# Patient Record
Sex: MALE | Race: WHITE | NOT HISPANIC OR LATINO | Employment: OTHER | URBAN - METROPOLITAN AREA
[De-identification: names, ages, dates, MRNs, and addresses within clinical notes are randomized per-mention and may not be internally consistent; named-entity substitution may affect disease eponyms.]

---

## 2021-12-29 ENCOUNTER — HOSPITAL ENCOUNTER (OUTPATIENT)
Dept: RADIOLOGY | Facility: MEDICAL CENTER | Age: 61
End: 2021-12-29

## 2021-12-29 ENCOUNTER — HOSPITAL ENCOUNTER (INPATIENT)
Facility: MEDICAL CENTER | Age: 61
LOS: 2 days | DRG: 083 | End: 2021-12-31
Attending: EMERGENCY MEDICINE | Admitting: SURGERY
Payer: COMMERCIAL

## 2021-12-29 DIAGNOSIS — R51.9 NONINTRACTABLE HEADACHE, UNSPECIFIED CHRONICITY PATTERN, UNSPECIFIED HEADACHE TYPE: ICD-10-CM

## 2021-12-29 DIAGNOSIS — S06.5XAA SUBDURAL HEMATOMA (HCC): ICD-10-CM

## 2021-12-29 DIAGNOSIS — Z79.01 CHRONIC ANTICOAGULATION: ICD-10-CM

## 2021-12-29 PROBLEM — Z11.52 ENCOUNTER FOR SCREENING FOR COVID-19: Status: ACTIVE | Noted: 2021-12-29

## 2021-12-29 PROBLEM — I48.91 AF (ATRIAL FIBRILLATION) (HCC): Status: ACTIVE | Noted: 2021-12-29

## 2021-12-29 PROBLEM — Z98.890 H/O MITRAL VALVE REPAIR: Status: ACTIVE | Noted: 2021-12-29

## 2021-12-29 PROBLEM — S32.009A LUMBAR TRANSVERSE PROCESS FRACTURE, CLOSED, INITIAL ENCOUNTER (HCC): Status: ACTIVE | Noted: 2021-12-29

## 2021-12-29 PROBLEM — T14.90XA TRAUMA: Status: ACTIVE | Noted: 2021-12-29

## 2021-12-29 LAB
APTT PPP: 39.8 SEC (ref 24.7–36)
CFT BLD TEG: 8.5 MIN (ref 4.6–9.1)
CFT P HPASE BLD TEG: 6.9 MIN (ref 4.3–8.3)
CLOT ANGLE BLD TEG: 68.3 DEGREES (ref 63–78)
CT.EXTRINSIC BLD ROTEM: 1.8 MIN (ref 0.8–2.1)
INR PPP: 1.23 (ref 0.87–1.13)
MCF BLD TEG: 59.8 MM (ref 52–69)
MCF.PLATELET INHIB BLD ROTEM: 19.2 MM (ref 15–32)
PA AA BLD-ACNC: 39.4 % (ref 0–11)
PA ADP BLD-ACNC: 6.7 % (ref 0–17)
PROTHROMBIN TIME: 15.2 SEC (ref 12–14.6)
TEG ALGORITHM TGALG: ABNORMAL

## 2021-12-29 PROCEDURE — 700101 HCHG RX REV CODE 250: Performed by: SURGERY

## 2021-12-29 PROCEDURE — A9270 NON-COVERED ITEM OR SERVICE: HCPCS | Performed by: SURGERY

## 2021-12-29 PROCEDURE — 36415 COLL VENOUS BLD VENIPUNCTURE: CPT

## 2021-12-29 PROCEDURE — 99222 1ST HOSP IP/OBS MODERATE 55: CPT | Performed by: SURGERY

## 2021-12-29 PROCEDURE — 85576 BLOOD PLATELET AGGREGATION: CPT

## 2021-12-29 PROCEDURE — 85610 PROTHROMBIN TIME: CPT | Mod: 91

## 2021-12-29 PROCEDURE — 85347 COAGULATION TIME ACTIVATED: CPT

## 2021-12-29 PROCEDURE — 85730 THROMBOPLASTIN TIME PARTIAL: CPT | Mod: 91

## 2021-12-29 PROCEDURE — 700102 HCHG RX REV CODE 250 W/ 637 OVERRIDE(OP): Performed by: SURGERY

## 2021-12-29 PROCEDURE — A9270 NON-COVERED ITEM OR SERVICE: HCPCS | Performed by: PHYSICIAN ASSISTANT

## 2021-12-29 PROCEDURE — 85384 FIBRINOGEN ACTIVITY: CPT

## 2021-12-29 PROCEDURE — 700102 HCHG RX REV CODE 250 W/ 637 OVERRIDE(OP): Performed by: PHYSICIAN ASSISTANT

## 2021-12-29 PROCEDURE — 770022 HCHG ROOM/CARE - ICU (200)

## 2021-12-29 PROCEDURE — 99291 CRITICAL CARE FIRST HOUR: CPT

## 2021-12-29 RX ORDER — BUTALBITAL, ACETAMINOPHEN AND CAFFEINE 50; 325; 40 MG/1; MG/1; MG/1
1 TABLET ORAL EVERY 6 HOURS PRN
Status: DISCONTINUED | OUTPATIENT
Start: 2021-12-29 | End: 2021-12-31 | Stop reason: HOSPADM

## 2021-12-29 RX ORDER — AMOXICILLIN 250 MG
1 CAPSULE ORAL NIGHTLY
Status: DISCONTINUED | OUTPATIENT
Start: 2021-12-29 | End: 2021-12-31 | Stop reason: HOSPADM

## 2021-12-29 RX ORDER — ENEMA 19; 7 G/133ML; G/133ML
1 ENEMA RECTAL
Status: DISCONTINUED | OUTPATIENT
Start: 2021-12-29 | End: 2021-12-31 | Stop reason: HOSPADM

## 2021-12-29 RX ORDER — LEVETIRACETAM 500 MG/1
500 TABLET ORAL 2 TIMES DAILY
Status: DISCONTINUED | OUTPATIENT
Start: 2021-12-29 | End: 2021-12-31 | Stop reason: HOSPADM

## 2021-12-29 RX ORDER — BISACODYL 10 MG
10 SUPPOSITORY, RECTAL RECTAL
Status: DISCONTINUED | OUTPATIENT
Start: 2021-12-29 | End: 2021-12-31 | Stop reason: HOSPADM

## 2021-12-29 RX ORDER — AMOXICILLIN 250 MG
1 CAPSULE ORAL
Status: DISCONTINUED | OUTPATIENT
Start: 2021-12-29 | End: 2021-12-31 | Stop reason: HOSPADM

## 2021-12-29 RX ORDER — ACETAMINOPHEN 500 MG
500 TABLET ORAL EVERY 6 HOURS PRN
Status: ON HOLD | COMMUNITY
End: 2021-12-31

## 2021-12-29 RX ORDER — HYDROMORPHONE HYDROCHLORIDE 1 MG/ML
0.5 INJECTION, SOLUTION INTRAMUSCULAR; INTRAVENOUS; SUBCUTANEOUS
Status: DISCONTINUED | OUTPATIENT
Start: 2021-12-29 | End: 2021-12-30

## 2021-12-29 RX ORDER — ONDANSETRON 2 MG/ML
4 INJECTION INTRAMUSCULAR; INTRAVENOUS EVERY 4 HOURS PRN
Status: DISCONTINUED | OUTPATIENT
Start: 2021-12-29 | End: 2021-12-30

## 2021-12-29 RX ORDER — OXYCODONE HYDROCHLORIDE 5 MG/1
5 TABLET ORAL
Status: DISCONTINUED | OUTPATIENT
Start: 2021-12-29 | End: 2021-12-31 | Stop reason: HOSPADM

## 2021-12-29 RX ORDER — POLYETHYLENE GLYCOL 3350 17 G/17G
1 POWDER, FOR SOLUTION ORAL 2 TIMES DAILY
Status: DISCONTINUED | OUTPATIENT
Start: 2021-12-29 | End: 2021-12-31 | Stop reason: HOSPADM

## 2021-12-29 RX ORDER — DOCUSATE SODIUM 100 MG/1
100 CAPSULE, LIQUID FILLED ORAL 2 TIMES DAILY
Status: DISCONTINUED | OUTPATIENT
Start: 2021-12-29 | End: 2021-12-31 | Stop reason: HOSPADM

## 2021-12-29 RX ORDER — OXYCODONE HYDROCHLORIDE 10 MG/1
10 TABLET ORAL
Status: DISCONTINUED | OUTPATIENT
Start: 2021-12-29 | End: 2021-12-31 | Stop reason: HOSPADM

## 2021-12-29 RX ORDER — HYDRALAZINE HYDROCHLORIDE 20 MG/ML
20 INJECTION INTRAMUSCULAR; INTRAVENOUS EVERY 6 HOURS PRN
Status: DISCONTINUED | OUTPATIENT
Start: 2021-12-29 | End: 2021-12-30

## 2021-12-29 RX ORDER — LABETALOL HYDROCHLORIDE 5 MG/ML
10 INJECTION, SOLUTION INTRAVENOUS EVERY 4 HOURS PRN
Status: DISCONTINUED | OUTPATIENT
Start: 2021-12-29 | End: 2021-12-31 | Stop reason: HOSPADM

## 2021-12-29 RX ORDER — ACETAMINOPHEN 500 MG
1000 TABLET ORAL EVERY 6 HOURS PRN
Status: DISCONTINUED | OUTPATIENT
Start: 2022-01-03 | End: 2021-12-31 | Stop reason: HOSPADM

## 2021-12-29 RX ORDER — WARFARIN SODIUM 5 MG/1
5 TABLET ORAL DAILY
Status: ON HOLD | COMMUNITY
End: 2021-12-30

## 2021-12-29 RX ORDER — ACETAMINOPHEN 500 MG
1000 TABLET ORAL EVERY 6 HOURS
Status: DISCONTINUED | OUTPATIENT
Start: 2021-12-29 | End: 2021-12-31 | Stop reason: HOSPADM

## 2021-12-29 RX ORDER — ONDANSETRON 4 MG/1
4 TABLET, ORALLY DISINTEGRATING ORAL EVERY 4 HOURS PRN
Status: DISCONTINUED | OUTPATIENT
Start: 2021-12-29 | End: 2021-12-31 | Stop reason: HOSPADM

## 2021-12-29 RX ADMIN — DILTIAZEM HYDROCHLORIDE 30 MG: 30 TABLET, FILM COATED ORAL at 21:03

## 2021-12-29 RX ADMIN — ACETAMINOPHEN 1000 MG: 500 TABLET ORAL at 18:13

## 2021-12-29 RX ADMIN — DOCUSATE SODIUM 50 MG AND SENNOSIDES 8.6 MG 1 TABLET: 8.6; 5 TABLET, FILM COATED ORAL at 21:03

## 2021-12-29 RX ADMIN — LABETALOL HYDROCHLORIDE 10 MG: 5 INJECTION, SOLUTION INTRAVENOUS at 16:09

## 2021-12-29 RX ADMIN — BUTALBITAL, ACETAMINOPHEN, AND CAFFEINE 1 TABLET: 50; 325; 40 TABLET ORAL at 20:15

## 2021-12-29 RX ADMIN — OXYCODONE 5 MG: 5 TABLET ORAL at 22:27

## 2021-12-29 RX ADMIN — LEVETIRACETAM 500 MG: 500 TABLET, FILM COATED ORAL at 21:03

## 2021-12-29 RX ADMIN — OXYCODONE 5 MG: 5 TABLET ORAL at 14:30

## 2021-12-29 RX ADMIN — OXYCODONE 5 MG: 5 TABLET ORAL at 18:13

## 2021-12-29 RX ADMIN — BUTALBITAL, ACETAMINOPHEN, AND CAFFEINE 1 TABLET: 50; 325; 40 TABLET ORAL at 13:06

## 2021-12-29 RX ADMIN — ACETAMINOPHEN 1000 MG: 500 TABLET ORAL at 13:29

## 2021-12-29 ASSESSMENT — PAIN DESCRIPTION - PAIN TYPE
TYPE: ACUTE PAIN

## 2021-12-29 ASSESSMENT — COPD QUESTIONNAIRES
COPD SCREENING SCORE: 2
HAVE YOU SMOKED AT LEAST 100 CIGARETTES IN YOUR ENTIRE LIFE: NO/DON'T KNOW
DO YOU EVER COUGH UP ANY MUCUS OR PHLEGM?: NO/ONLY WITH OCCASIONAL COLDS OR INFECTIONS
DURING THE PAST 4 WEEKS HOW MUCH DID YOU FEEL SHORT OF BREATH: NONE/LITTLE OF THE TIME

## 2021-12-29 ASSESSMENT — FIBROSIS 4 INDEX
FIB4 SCORE: 1.46
FIB4 SCORE: 1.46

## 2021-12-29 ASSESSMENT — PATIENT HEALTH QUESTIONNAIRE - PHQ9
1. LITTLE INTEREST OR PLEASURE IN DOING THINGS: NOT AT ALL
SUM OF ALL RESPONSES TO PHQ9 QUESTIONS 1 AND 2: 0
2. FEELING DOWN, DEPRESSED, IRRITABLE, OR HOPELESS: NOT AT ALL

## 2021-12-29 NOTE — PROGRESS NOTES
Patient arrived to S122 and attached to ICU monitor.    Vitals:  BP- 136/73  HR- 64  SpO2- 91% on RA  Temp- 97.8 F temporal  Weight- 119 kg    4 Eyes Skin Assessment Completed by KRISTY Jennings and KRISTY Carlos.     Areas of concern:   - RLE abrasion    Devices in place and skin assessed underneath:   [x]Pulse Ox    [x]EKG leads   [x]BP cuff   [x]PIVx 2  []Central Line   []Arterial line   []SCDs   []Valencia   []Purewick   []Condom Cath   []BMS/rectal tube   []OG/NG Tube   []Cortrak   []ETT/tracheostomy tube   []Oxymask   []Bipap    []Nasal Canula   []Restraints   []C-collar   []Splint (specify):   []Other(specify):     Belongings (placed in patient closet unless otherwise noted):   - Wallet    - Glasses   - Shirt   - Pants   - Underwear   - Shoes   - Socks   - Hat   - Cell phone (at bedside)

## 2021-12-29 NOTE — ED NOTES
Bedside report given to ICU RN. Pt taken up to SICU in wheelchair. Pt has all belongings in his possession.

## 2021-12-29 NOTE — ASSESSMENT & PLAN NOTE
Chronic condition treated with warfarin and diltiazem.  Systemic anticoagulation held on admission.   12/30 Resumed diltiazem.

## 2021-12-29 NOTE — ED TRIAGE NOTES
"Chief Complaint   Patient presents with   • Headache     Pt bib EMS from [facility] after it was discovered he had a right subdural hematoma after falling off a horse on Thanksgiving day. Pt is on warfarin from a mitral valve replacement 4 years ago, warfarin was reversed at previous facility with K-centra. Pt was on cardene upon arrival. Pt complains of 4/10 headache, pt received IV tylenol from EMS. Pt also has a L5 transverse fracture that does not require surgery.      /70   Pulse 70   Temp 36.4 °C (97.5 °F) (Temporal)   Resp 18   Ht 1.854 m (6' 1\")   Wt 113 kg (250 lb)   SpO2 91%   BMI 32.98 kg/m²     "

## 2021-12-29 NOTE — ED PROVIDER NOTES
ED Provider Note    Scribed for Tonny Whelan M.D. by Emma Chambers. 12/29/2021  11:05 AM    Primary care provider: Alfredito Vela M.D.  Means of arrival: EMS  History obtained from: EMS and Patient  History limited by: None noted    CHIEF COMPLAINT  Chief Complaint   Patient presents with    Headache     Pt bib EMS from [facility] after it was discovered he had a right subdural hematoma after falling off a horse on Thanksgiving day. Pt is on warfarin from a mitral valve replacement 4 years ago, warfarin was reversed at previous facility with K-centra. Pt was on cardene upon arrival. Pt complains of 4/10 headache, pt received IV tylenol from EMS. Pt also has a L5 transverse fracture that does not require surgery.        HPI  Will Walker is a 61 y.o. male who presents to the Emergency Department with a headache onset 2 weeks ago. He rates his headache a 4/10 in severity. He denies any vision changes and weakness. Patient was initially bucked off a horse and has been taking warfarin. Patient states he is followed by Dr. Patino, cardiology. The patient has a history of biological valve. Patient has a history of L5 transverse fracture.    REVIEW OF SYSTEMS   Pertinent positives include headache.   Pertinent negatives include no vision changes or weakness.    All other systems reviewed and negative. See HPI for further details.     PAST MEDICAL HISTORY   has a past medical history of No known health problems and Primary pulmonary HTN (HCC).    SURGICAL HISTORY   has a past surgical history that includes coronart artery bypass, 2 (11/2017) and other.    SOCIAL HISTORY  Social History     Tobacco Use    Smoking status: Never Smoker    Smokeless tobacco: Former User     Types: Chew   Vaping Use    Vaping Use: Never used   Substance Use Topics    Alcohol use: Not on file    Drug use: No      Social History     Substance and Sexual Activity   Drug Use No       FAMILY HISTORY  None noted    CURRENT MEDICATIONS  Home Medications  "      Reviewed by Dorie Dick R.N. (Registered Nurse) on 12/29/21 at 1101  Med List Status: Not Addressed     Medication Last Dose Status   aspirin EC (ECOTRIN) 81 MG Tablet Delayed Response  Active   DILTIAZem (CARDIZEM) 30 MG Tab  Active   ibuprofen (MOTRIN) 200 MG Tab  Active   MD ALERT... warfarin (COUMADIN)  Active   simvastatin (ZOCOR) 5 MG Tab  Active                    ALLERGIES  No Known Allergies    PHYSICAL EXAM  VITAL SIGNS: /70   Pulse 70   Temp 36.4 °C (97.5 °F) (Temporal)   Resp 18   Ht 1.854 m (6' 1\")   Wt 113 kg (250 lb)   SpO2 91%   BMI 32.98 kg/m²     Nursing note and vitals reviewed.  Constitutional: Well-developed and well-nourished. No distress.   HENT: Head is normocephalic and atraumatic. Oropharynx is clear and moist without exudate or erythema.   Eyes: Pupils are equal, round, and reactive to light. Conjunctiva are normal.   Cardiovascular: Normal rate and regular rhythm. No murmur heard. Normal radial pulses.  Pulmonary/Chest: Breath sounds normal. No wheezes or rales.   Abdominal: Soft and non-tender. No distention    Musculoskeletal: Extremities exhibit normal range of motion without edema or tenderness.   Neurological: Awake, alert and oriented to person, place, and time. No focal deficits noted.  Skin: Skin is warm and dry. No rash.   Psychiatric: Normal mood and affect. Appropriate for clinical situation.    DIAGNOSTIC STUDIES / PROCEDURES    LABS  Results for orders placed or performed during the hospital encounter of 12/29/21   CBC WITH DIFFERENTIAL   Result Value Ref Range    WBC 6.6 4.8 - 10.8 K/uL    RBC 5.21 4.70 - 6.10 M/uL    Hemoglobin 15.7 14.0 - 18.0 g/dL    Hematocrit 47.0 42.0 - 52.0 %    MCV 90.2 80.0 - 94.0 fL    MCH 30.1 27.0 - 31.0 pg    MCHC 33.4 33.0 - 37.0 g/dL    RDW 12.5 11.5 - 14.5 %    Platelet Count 169 130 - 400 K/uL    MPV 10.8 (H) 7.4 - 10.4 fL    Neutrophils Automated 66.2 39.0 - 70.0 %    Lymphocytes Automated 19.6 (L) 21.0 - 50.0 %    " Monocytes Automated 11.7 (H) 2.0 - 9.0 %    Eosinophils Automated 1.4 0.0 - 5.0 %    Basophils Automated 0.9 0.0 - 3.0 %    Abs Neutrophils Automated 4.4 1.8 - 7.7 K/uL    Abs Lymph Automated 1.3 1.2 - 4.8 K/uL    Eosinophil Count, Blood 0.09 0.00 - 0.50 K/uL   COMP METABOLIC PANEL   Result Value Ref Range    Sodium 137 136 - 145 mmol/L    Potassium 4.1 3.5 - 5.1 mmol/L    Chloride 101 98 - 107 mmol/L    Co2 24 21 - 32 mmol/L    Anion Gap 16 10 - 18 mmol/L    Glucose 108 (H) 74 - 99 mg/dL    Bun 17 7 - 18 mg/dL    Creatinine 1.1 0.8 - 1.3 mg/dL    Calcium 9.0 8.5 - 11.0 mg/dL    AST(SGOT) 21 15 - 37 U/L    ALT(SGPT) 27 12 - 78 U/L    Alkaline Phosphatase 80 46 - 116 U/L    Total Bilirubin 0.8 0.2 - 1.0 mg/dL    Albumin 4.2 3.4 - 5.0 g/dL    Total Protein 7.9 6.4 - 8.2 g/dL    A-G Ratio 1.1    Prothrombin Time   Result Value Ref Range    PT 22.9 (H) 9.3 - 11.7 sec    INR 2.17    APTT   Result Value Ref Range    APTT 43.6 (H) 22.8 - 31.5 sec   ESTIMATED GFR   Result Value Ref Range    GFR If African American >60 >60 mL/min/1.73 m 2    GFR If Non African American >60 >60 mL/min/1.73 m 2   SARS-COV Antigen TRENT   Result Value Ref Range    SARS-CoV-2 Source NP Swab     SARS-COV ANTIGEN TRENT NotDetected Not-Detected     All labs reviewed by me.    COURSE & MEDICAL DECISION MAKING  Nursing notes, VS, PMSFHx reviewed in chart.     Review of past medical records shows the patient was seen at outside hospital and had a CT scan showing right subdural hemorrhage, 16 mm, 8 mm midline shift. Outpatient CT scan today and was noted to have subdural and sent to ED. History is remarkable for being bucked off horse on Thanksgiving. Developed headache 10-14 days ago. He received K-Centra prior to arrival.    11:05 AM - Patient seen and examined at bedside. Ordered Platelet Mapping with Basic TEG, Prothrombin Time, and PTT to evaluate his symptoms. The patient presents today as a transfer for further evaluation of a subdural hemorrhage.      11:08 AM - Paged Neurosurgery.    11:19 AM I discussed the patient's case and the above findings with Dr. Merino (neurosurgery) who states he is stable to go to the floor. Likely OR tomorrow.     11:25 AM - Paged hospitalist and trauma.  6  11:29 AM I discussed the patient's case and the above findings with Dr. Wood (trauma) who will accept the patient to the ICU.    Patient presents today with a subdural hematoma.  He has a history of chronic anticoagulation.  He was given reversal prior to transfer.  Patient will be admitted to the intensive care unit.  In the emergency department patient remains with a GCS of 15.    CRITICAL CARE  I provided critical care services, which included medication orders, frequent reevaluations of the patient's condition and response to treatment, ordering and reviewing test results, and discussing the case with various consultants.  The critical care time associated with the care of the patient was 35 minutes. Review chart for interventions. This time is exclusive of any other billable procedures.      DISPOSITION:  Patient will be hospitalized by Dr. Wood in gaurded condition.    FINAL IMPRESSION  1. Nonintractable headache, unspecified chronicity pattern, unspecified headache type    2. Subdural hematoma (HCC)    3. Chronic anticoagulation          Emma RILEY (Mendezibrosalba), am scribing for, and in the presence of, Tonny Whelan M.D..    Electronically signed by: Emma Chambers (Scribe), 12/29/2021    Tonny RILEY M.D. personally performed the services described in this documentation, as scribed by Emma Chambers in my presence, and it is both accurate and complete. C.    The note accurately reflects work and decisions made by me.  Tonny Whelan M.D.  12/29/2021  2:57 PM

## 2021-12-29 NOTE — CONSULTS
ID:  Will Walker; 61 y.o. male      Admission Date: 12/29/2021   Date of Consultation: 12/29/2021  Requesting Provider: Rashida Wood M.D.  PCP: Alfredito Vela M.D.        Chief Complaint:: headache    Reason for consultation:: R SDH      HPI:  Will Walker is a 61 y.o. male who presented to [facility] with chronic R SDH. Pt was bucked off horse on thanksgiving. Since then he has had several days with a severe headache. He was urged to seek out medical care. A Head CT at [place] demonstrated chronic R SDH. He was given Kcenttra and transered to [facility]. Complains of mild headache. Denies other complaints.        Past Medical History:  Past Medical History:   Diagnosis Date   • No known health problems    • Primary pulmonary HTN (HCC)        Past Surgical History:  Past Surgical History:   Procedure Laterality Date   • CORONART ARTERY BYPASS, 2  11/2017   • OTHER      mitral valve replaced       Social History:  Social History     Occupational History   • Not on file   Tobacco Use   • Smoking status: Never Smoker   • Smokeless tobacco: Former User     Types: Chew   Vaping Use   • Vaping Use: Never used   Substance and Sexual Activity   • Alcohol use: Not on file   • Drug use: No   • Sexual activity: Not on file     Social History     Social History Narrative   • Not on file       Family History:  No family history on file.    Medications:  Prior to Admission medications    Medication Sig Start Date End Date Taking? Authorizing Provider   warfarin (COUMADIN) 5 MG Tab Take 5 mg by mouth every day. 3 tablets = 15 mg. Patient takes 15 mg Monday, Wednesday, and Friday  2 tablets = 10 mg. Patient takes 10 mg Sunday, Tuesday, Thursday and Saturday   Yes Physician Outpatient   Probiotic Product (PROBIOTIC PO) Take 1 Tablet by mouth every day.   Yes Physician Outpatient   acetaminophen (TYLENOL) 500 MG Tab Take 500 mg by mouth every 6 hours as needed for Mild Pain (Headache).   Yes Physician Outpatient    DILTIAZem (CARDIZEM) 30 MG Tab Take 30 mg by mouth 2 times a day.    Physician Outpatient   simvastatin (ZOCOR) 5 MG Tab Take 5 mg by mouth every evening.    Physician Outpatient   aspirin EC (ECOTRIN) 81 MG Tablet Delayed Response Take 81 mg by mouth every day.    Physician Outpatient       Allergies:  No Known Allergies    Review of Systems:    negative for constitutional, HEENT, cardiac, pulmonary, GI, , musculoskeletal, psych, dermatologic, endo, hematologic, immunologic except: mild headache      PHYSICAL  EXAMINATION:     General:  Awake and alert, oriented X4  Normal affect, attention and concentration  Fluent, appropriate speech.  No acute distress, well appearing  Briskly follows commands    Cranial nerves:  PERRL, EOMI, VFF  Face symmetric, TML    Muscle testing:  RUE 5/5  LUE 5/5  RLE 5/5  LLE 5/5    No pronator drift  Sensation intact to light touch        LABS:  Recent Labs     12/29/21  0833   SODIUM 137   POTASSIUM 4.1   CHLORIDE 101   CO2 24   GLUCOSE 108*   BUN 17   CREATININE 1.1   CALCIUM 9.0     Recent Labs     12/29/21  0833   WBC 6.6   RBC 5.21   HEMOGLOBIN 15.7   HEMATOCRIT 47.0   MCV 90.2   MCH 30.1   MCHC 33.4   RDW 12.5   PLATELETCT 169   MPV 10.8*     Lab Results   Component Value Date/Time    PROTHROMBTM 15.2 (H) 12/29/2021 11:40 AM    INR 1.23 (H) 12/29/2021 11:40 AM      Recent Labs     12/29/21  0833 12/29/21  1140   ASTSGOT 21  --    ALTSGPT 27  --    TBILIRUBIN 0.8  --    ALKPHOSPHAT 80  --    INR 2.17 1.23*       Radiology Studies:     R sided mixed density SDH with 8 mm MLS    Impression and Plan:     Mr. Will Walker is a 61 y.o. year old male presenting with chronic R SDH. Pt has only mild headache with no deficits.      - cardiology consult for patient being off of coumadin with history of valve surgery  - keppra 500 mg BID  - pt to disucss surgery with wife. If patient wants surgery, then will proceed tomorrow afternoon.  - repeat HCT in AM    Thank you for the  consultation. Please do not hesitate contacting me with questions.    Dilip Merino III, MD, PhD  Board eligible neurosurgeon  [facility]

## 2021-12-29 NOTE — ED NOTES
Med rec completed per patient  Allergies reviewed  No PO Antibiotics in the last 30 days     Patients preferred pharmacy is [email or web address] in [place].     Patient does take Warfarin.

## 2021-12-29 NOTE — ASSESSMENT & PLAN NOTE
SDH with a shift.  Patient declining surgery.   Post traumatic pharmacologic seizure prophylaxis for 1 week.  Speech Language Pathology cognitive evaluation.  Dexamethasone 2 mg BID x 7 days.  Follow up outpatient in 2 weeks with repeat head CT.  Dilip Merino MD. Neurosurgeon. [facility].

## 2021-12-29 NOTE — ASSESSMENT & PLAN NOTE
Anticoagulated with Coumadin.  Hold warfarin indefinitely.  Cardiology consult note pending (spoke with cardiologist 12/30 @ 6422).

## 2021-12-29 NOTE — H&P
TRAUMA HISTORY AND PHYSICAL    DATE OF SERVICE: 12/29/2021    ACTIVATION LEVEL: not activated.     HISTORY OF PRESENT ILLNESS: The patient is a 61 year-old man who was injured when he was bucked off a horse around Thanksgiving.  He is chronically anticoagulated on warfarin for a bioprosthetic mitral valve and paroxysmal atrial fibrillation.   After being bucked off the horse, he saw his PCP and was diagnosed with a L5 transverse process fracture.  He then started having headaches around December 20th.  Headaches became more intense and he went to his PCP again, who sent him for further workup and subsequently a 16mm SDH with 8mm shift was seen.  This appears to be subacute.  He has still been on warfarin and ASA.  This was reversed at outside hospital, repeat lab and TEG are sent.  He is awake and alert, GCS 15.  He has not had weakness, syncope, falls, dizziness, facial asymmetry, word finding issues or slurred speech at all throughout this event.   He is sitting up in a chair on my visit.  He is a  and has continued to work throughout since his injury.     The patient was triaged to [facility] in accordance with established pre hospital protocols. An expeditious primary and secondary survey with required adjuncts was conducted. See Trauma Narrator for full details.    PAST MEDICAL HISTORY:   Past Medical History:   Diagnosis Date   • No known health problems    • Primary pulmonary HTN (HCC)      HTN    PAST SURGICAL HISTORY:   Past Surgical History:   Procedure Laterality Date   • CORONART ARTERY BYPASS, 2  11/2017   • OTHER      mitral valve replaced     as above     ALLERGIES: Patient has no known allergies.  No significant history of allergies     CURRENT MEDICATIONS:   Outpatient Medications Marked as Taking for the 12/29/21 encounter (Hospital Encounter)   Medication Sig   • warfarin (COUMADIN) 5 MG Tab Take 5 mg by mouth every day. 3 tablets = 15 mg. Patient takes 15 mg  "Monday, Wednesday, and Friday  2 tablets = 10 mg. Patient takes 10 mg Sunday, Tuesday, Thursday and Saturday   • Probiotic Product (PROBIOTIC PO) Take 1 Tablet by mouth every day.   • acetaminophen (TYLENOL) 500 MG Tab Take 500 mg by mouth every 6 hours as needed for Mild Pain (Headache).   ASA 81, Coumadin, carvedilol, simvastatin    FAMILY HISTORY: family history is not on file.  Reviewed and found to be non-contributory in regards to the above presentation    SOCIAL HISTORY:  reports that he has never smoked. He has quit using smokeless tobacco.  His smokeless tobacco use included chew. He reports that he does not use drugs.  occassional etoh, works as a .     REVIEW OF SYSTEMS:   Comprehensive review of systems is negative with the exception of the aforementioned HPI, PMH, and PSH bullets in accordance with CMS guidelines.    PHYSICAL EXAMINATION:   VITAL SIGNS:   /72   Pulse 60   Temp 36.4 °C (97.5 °F) (Temporal)   Resp 14   Ht 1.854 m (6' 1\")   Wt 113 kg (250 lb)   SpO2 88%     GENERAL:   · The patient appears stated age, is in no apparent distress, is well developed and well nourished    HEENT:  · HEAD: Atraumatic, normocephalic.    · EARS: The ear canals and tympanic membranes are normal.Normal pinna bilaterally.    · EYES:  The pupils are equal, round, and reactive to light bilaterally. The extraocular muscles are intact bilaterally..    · NOSE: No rhinorrhea.  The bilateral nares are clear.  · THROAT: Oral mucosa is moist.  The oropharynx are clear.    FACE:   · The midface and jaw are stable. No malocclusion is evident.    NECK:    · The cervical spine was examined utilizing spinal motion restriction.   · The cervical spine is supple and non tender. Normal range of motion. The trachea is midline..    CHEST:    · Inspection: Unlabored respirations, no intercostal retractions, paradoxical motion, or accessory muscle use.  · Palpation:  The chest is nontender. The " clavicles are non deformed bilaterally..  · Auscultation: normal.    CARDIOVASCULAR:    · Auscultation: normal and regular rate and rhythm.  · Peripheral Pulses: Normal.      ABDOMEN:    · Abdomen is soft, nontender, without organomegaly or masses.    BACK/PELVIS:    · The thoracolumbar spine was examined utilizing spinal motion restriction.   · Inspection and palpation reveal no significant tenderness, swelling, or deformity in the thoracolumbar region.  · The pelvis is stable.    RECTAL:  Deferred    GENITOURINARY:  The patient has normal external reproductive anatomy.    EXTREMITIES:  · RIGHT ARM: Without deformities, wounds, lacerations, or excoriations.  Full passive and active range of motion without pain.  · LEFT ARM: Without deformities, wounds, lacerations, or excoriations.  Full passive and active range of motion without pain.  · RIGHT LEG: Without deformities, wounds, lacerations, or excoriations.  Full passive and active range of motion without pain.  · LEFT LEG: Without deformities, wounds, lacerations, or excoriations.  Full passive and active range of motion without pain.    NEUROLOGIC:    · Dean Coma Scale (GCS) 15.   · Neurologic examination revealed no focal deficits noted, mental status intact, cranial nerves II through XII intact, muscle tone normal, muscle strength normal, oriented for age x3.    SKIN:  · The skin is warm and dry.    PSYCHIATRIC:   · The patient does not appear depressed or anxious, oriented to time, place, person, short and long term memory appears intact, judgement and insight appear appropriate for age.    LABORATORY VALUES:   Recent Labs     12/29/21  0833   WBC 6.6   RBC 5.21   HEMOGLOBIN 15.7   HEMATOCRIT 47.0   MCV 90.2   MCH 30.1   MCHC 33.4   RDW 12.5   PLATELETCT 169   MPV 10.8*     Recent Labs     12/29/21  0833   SODIUM 137   POTASSIUM 4.1   CHLORIDE 101   CO2 24   GLUCOSE 108*   BUN 17   CREATININE 1.1   CALCIUM 9.0     Recent Labs     12/29/21  0833  12/29/21  1140   ASTSGOT 21  --    ALTSGPT 27  --    TBILIRUBIN 0.8  --    ALKPHOSPHAT 80  --    INR 2.17 1.23*     Recent Labs     12/29/21  0833 12/29/21  1140   APTT 43.6* 39.8*   INR 2.17 1.23*        IMAGING:   No orders to display       IMPRESSION AND PLAN:  * SDH (subdural hematoma) (HCC)- (present on admission)  Assessment & Plan  SDH with a shift.  Definitive plan pending.  Post traumatic pharmacologic seizure prophylaxis for 1 week.  Speech Language Pathology cognitive evaluation.  Dilip Merino MD. Neurosurgeon. [facility].    AF (atrial fibrillation) (HCC)- (present on admission)  Assessment & Plan  Chronic condition treated with warfarin and diltiazem.  Systemic anticoagulation held on admission.    H/O mitral valve repair- (present on admission)  Assessment & Plan  Anticoagulated.    Encounter for screening for COVID-19- (present on admission)  Assessment & Plan  12/29 COVID-19 specimen sent. AIRBORNE & CONTACT/EYE ISOLATION implemented pending final SARS-CoV-2 testing.    Trauma- (present on admission)  Assessment & Plan  Remote trauma.  Trauma Green Transfer Activation.  Rashida Wood MD. Trauma Surgery.      Aggregated care time spent evaluating, reviewing documentation, providing care, and managing this patient exclusive of procedures: 55 minutes  ____________________________________   Rashida Wood M.D.     MANDY / JERZY     DD: 12/29/2021   DT: 11:31 AM

## 2021-12-30 ENCOUNTER — APPOINTMENT (OUTPATIENT)
Dept: RADIOLOGY | Facility: MEDICAL CENTER | Age: 61
DRG: 083 | End: 2021-12-30
Attending: SURGERY
Payer: COMMERCIAL

## 2021-12-30 PROBLEM — I62.9 ICB (INTRACRANIAL BLEED) (HCC): Status: ACTIVE | Noted: 2021-12-30

## 2021-12-30 PROBLEM — Z79.01 ANTICOAGULATED: Status: ACTIVE | Noted: 2021-12-30

## 2021-12-30 PROBLEM — I62.9 ICB (INTRACRANIAL BLEED) (HCC): Status: RESOLVED | Noted: 2021-12-30 | Resolved: 2021-12-30

## 2021-12-30 PROBLEM — Z11.52 ENCOUNTER FOR SCREENING FOR COVID-19: Status: RESOLVED | Noted: 2021-12-29 | Resolved: 2021-12-30

## 2021-12-30 LAB
ALBUMIN SERPL BCP-MCNC: 4.1 G/DL (ref 3.2–4.9)
ALBUMIN/GLOB SERPL: 1.8 G/DL
ALP SERPL-CCNC: 65 U/L (ref 30–99)
ALT SERPL-CCNC: 17 U/L (ref 2–50)
ANION GAP SERPL CALC-SCNC: 10 MMOL/L (ref 7–16)
AST SERPL-CCNC: 14 U/L (ref 12–45)
BASOPHILS # BLD AUTO: 0.9 % (ref 0–1.8)
BASOPHILS # BLD: 0.04 K/UL (ref 0–0.12)
BILIRUB SERPL-MCNC: 0.8 MG/DL (ref 0.1–1.5)
BUN SERPL-MCNC: 18 MG/DL (ref 8–22)
CALCIUM SERPL-MCNC: 8.8 MG/DL (ref 8.5–10.5)
CHLORIDE SERPL-SCNC: 104 MMOL/L (ref 96–112)
CO2 SERPL-SCNC: 25 MMOL/L (ref 20–33)
CREAT SERPL-MCNC: 0.91 MG/DL (ref 0.5–1.4)
EOSINOPHIL # BLD AUTO: 0.15 K/UL (ref 0–0.51)
EOSINOPHIL NFR BLD: 3.5 % (ref 0–6.9)
ERYTHROCYTE [DISTWIDTH] IN BLOOD BY AUTOMATED COUNT: 43.3 FL (ref 35.9–50)
GLOBULIN SER CALC-MCNC: 2.3 G/DL (ref 1.9–3.5)
GLUCOSE BLD-MCNC: 88 MG/DL (ref 65–99)
GLUCOSE BLD-MCNC: 89 MG/DL (ref 65–99)
GLUCOSE SERPL-MCNC: 88 MG/DL (ref 65–99)
HCT VFR BLD AUTO: 40.8 % (ref 42–52)
HGB BLD-MCNC: 13.3 G/DL (ref 14–18)
IMM GRANULOCYTES # BLD AUTO: 0.01 K/UL (ref 0–0.11)
IMM GRANULOCYTES NFR BLD AUTO: 0.2 % (ref 0–0.9)
INR PPP: 1.09 (ref 0.87–1.13)
LYMPHOCYTES # BLD AUTO: 1.08 K/UL (ref 1–4.8)
LYMPHOCYTES NFR BLD: 25.1 % (ref 22–41)
MAGNESIUM SERPL-MCNC: 2.2 MG/DL (ref 1.5–2.5)
MCH RBC QN AUTO: 30.1 PG (ref 27–33)
MCHC RBC AUTO-ENTMCNC: 32.6 G/DL (ref 33.7–35.3)
MCV RBC AUTO: 92.3 FL (ref 81.4–97.8)
MONOCYTES # BLD AUTO: 0.58 K/UL (ref 0–0.85)
MONOCYTES NFR BLD AUTO: 13.5 % (ref 0–13.4)
NEUTROPHILS # BLD AUTO: 2.45 K/UL (ref 1.82–7.42)
NEUTROPHILS NFR BLD: 56.8 % (ref 44–72)
NRBC # BLD AUTO: 0 K/UL
NRBC BLD-RTO: 0 /100 WBC
PHOSPHATE SERPL-MCNC: 3.9 MG/DL (ref 2.5–4.5)
PLATELET # BLD AUTO: 143 K/UL (ref 164–446)
PMV BLD AUTO: 11 FL (ref 9–12.9)
POTASSIUM SERPL-SCNC: 4.3 MMOL/L (ref 3.6–5.5)
PROT SERPL-MCNC: 6.4 G/DL (ref 6–8.2)
PROTHROMBIN TIME: 13.8 SEC (ref 12–14.6)
RBC # BLD AUTO: 4.42 M/UL (ref 4.7–6.1)
SODIUM SERPL-SCNC: 139 MMOL/L (ref 135–145)
WBC # BLD AUTO: 4.3 K/UL (ref 4.8–10.8)

## 2021-12-30 PROCEDURE — 99254 IP/OBS CNSLTJ NEW/EST MOD 60: CPT | Performed by: INTERNAL MEDICINE

## 2021-12-30 PROCEDURE — 80053 COMPREHEN METABOLIC PANEL: CPT

## 2021-12-30 PROCEDURE — 92523 SPEECH SOUND LANG COMPREHEN: CPT

## 2021-12-30 PROCEDURE — A9270 NON-COVERED ITEM OR SERVICE: HCPCS | Performed by: SURGERY

## 2021-12-30 PROCEDURE — 770001 HCHG ROOM/CARE - MED/SURG/GYN PRIV*

## 2021-12-30 PROCEDURE — 700102 HCHG RX REV CODE 250 W/ 637 OVERRIDE(OP): Performed by: NURSE PRACTITIONER

## 2021-12-30 PROCEDURE — A9270 NON-COVERED ITEM OR SERVICE: HCPCS | Performed by: PHYSICIAN ASSISTANT

## 2021-12-30 PROCEDURE — 83735 ASSAY OF MAGNESIUM: CPT

## 2021-12-30 PROCEDURE — 84100 ASSAY OF PHOSPHORUS: CPT

## 2021-12-30 PROCEDURE — 99232 SBSQ HOSP IP/OBS MODERATE 35: CPT | Performed by: SURGERY

## 2021-12-30 PROCEDURE — 700102 HCHG RX REV CODE 250 W/ 637 OVERRIDE(OP): Performed by: PHYSICIAN ASSISTANT

## 2021-12-30 PROCEDURE — 85610 PROTHROMBIN TIME: CPT

## 2021-12-30 PROCEDURE — 82962 GLUCOSE BLOOD TEST: CPT

## 2021-12-30 PROCEDURE — 70450 CT HEAD/BRAIN W/O DYE: CPT

## 2021-12-30 PROCEDURE — A9270 NON-COVERED ITEM OR SERVICE: HCPCS | Performed by: NURSE PRACTITIONER

## 2021-12-30 PROCEDURE — 700111 HCHG RX REV CODE 636 W/ 250 OVERRIDE (IP): Performed by: SURGERY

## 2021-12-30 PROCEDURE — 700102 HCHG RX REV CODE 250 W/ 637 OVERRIDE(OP): Performed by: SURGERY

## 2021-12-30 PROCEDURE — 85025 COMPLETE CBC W/AUTO DIFF WBC: CPT

## 2021-12-30 RX ORDER — SIMVASTATIN 20 MG
5 TABLET ORAL NIGHTLY
Status: DISCONTINUED | OUTPATIENT
Start: 2021-12-30 | End: 2021-12-31 | Stop reason: HOSPADM

## 2021-12-30 RX ORDER — DEXAMETHASONE 4 MG/1
2 TABLET ORAL 2 TIMES DAILY
Status: DISCONTINUED | OUTPATIENT
Start: 2021-12-30 | End: 2021-12-31 | Stop reason: HOSPADM

## 2021-12-30 RX ADMIN — ACETAMINOPHEN 1000 MG: 500 TABLET ORAL at 23:58

## 2021-12-30 RX ADMIN — BUTALBITAL, ACETAMINOPHEN, AND CAFFEINE 1 TABLET: 50; 325; 40 TABLET ORAL at 10:02

## 2021-12-30 RX ADMIN — POLYETHYLENE GLYCOL 3350 1 PACKET: 17 POWDER, FOR SOLUTION ORAL at 17:40

## 2021-12-30 RX ADMIN — DEXAMETHASONE 2 MG: 1 TABLET ORAL at 18:01

## 2021-12-30 RX ADMIN — DILTIAZEM HYDROCHLORIDE 30 MG: 30 TABLET, FILM COATED ORAL at 17:41

## 2021-12-30 RX ADMIN — POLYETHYLENE GLYCOL 3350 1 PACKET: 17 POWDER, FOR SOLUTION ORAL at 05:18

## 2021-12-30 RX ADMIN — BUTALBITAL, ACETAMINOPHEN, AND CAFFEINE 1 TABLET: 50; 325; 40 TABLET ORAL at 20:29

## 2021-12-30 RX ADMIN — OXYCODONE 5 MG: 5 TABLET ORAL at 17:42

## 2021-12-30 RX ADMIN — OXYCODONE 5 MG: 5 TABLET ORAL at 13:08

## 2021-12-30 RX ADMIN — LEVETIRACETAM 500 MG: 500 TABLET, FILM COATED ORAL at 17:43

## 2021-12-30 RX ADMIN — BUTALBITAL, ACETAMINOPHEN, AND CAFFEINE 1 TABLET: 50; 325; 40 TABLET ORAL at 15:47

## 2021-12-30 RX ADMIN — BUTALBITAL, ACETAMINOPHEN, AND CAFFEINE 1 TABLET: 50; 325; 40 TABLET ORAL at 04:19

## 2021-12-30 RX ADMIN — SIMVASTATIN 5 MG: 20 TABLET, FILM COATED ORAL at 20:29

## 2021-12-30 RX ADMIN — HYDRALAZINE HYDROCHLORIDE 20 MG: 20 INJECTION INTRAMUSCULAR; INTRAVENOUS at 08:13

## 2021-12-30 RX ADMIN — OXYCODONE 5 MG: 5 TABLET ORAL at 01:58

## 2021-12-30 RX ADMIN — DEXAMETHASONE 2 MG: 1 TABLET ORAL at 12:03

## 2021-12-30 RX ADMIN — OXYCODONE 5 MG: 5 TABLET ORAL at 08:41

## 2021-12-30 RX ADMIN — LEVETIRACETAM 500 MG: 500 TABLET, FILM COATED ORAL at 05:17

## 2021-12-30 ASSESSMENT — COGNITIVE AND FUNCTIONAL STATUS - GENERAL
SUGGESTED CMS G CODE MODIFIER DAILY ACTIVITY: CH
MOBILITY SCORE: 24
DAILY ACTIVITIY SCORE: 24
SUGGESTED CMS G CODE MODIFIER MOBILITY: CH

## 2021-12-30 ASSESSMENT — PAIN DESCRIPTION - PAIN TYPE
TYPE: ACUTE PAIN

## 2021-12-30 ASSESSMENT — LIFESTYLE VARIABLES
EVER HAD A DRINK FIRST THING IN THE MORNING TO STEADY YOUR NERVES TO GET RID OF A HANGOVER: NO
ALCOHOL_USE: YES
HAVE PEOPLE ANNOYED YOU BY CRITICIZING YOUR DRINKING: NO
TOTAL SCORE: 0
HAVE YOU EVER FELT YOU SHOULD CUT DOWN ON YOUR DRINKING: NO
TOTAL SCORE: 0
AVERAGE NUMBER OF DAYS PER WEEK YOU HAVE A DRINK CONTAINING ALCOHOL: 1
HOW MANY TIMES IN THE PAST YEAR HAVE YOU HAD 5 OR MORE DRINKS IN A DAY: 0
CONSUMPTION TOTAL: NEGATIVE
DOES PATIENT WANT TO STOP DRINKING: NO
EVER FELT BAD OR GUILTY ABOUT YOUR DRINKING: NO
ON A TYPICAL DAY WHEN YOU DRINK ALCOHOL HOW MANY DRINKS DO YOU HAVE: 2
TOTAL SCORE: 0

## 2021-12-30 ASSESSMENT — ENCOUNTER SYMPTOMS
MYALGIAS: 1
HEADACHES: 1
VOMITING: 0
ROS GI COMMENTS: BM PTA
PSYCHIATRIC NEGATIVE: 1
DIZZINESS: 0
RESPIRATORY NEGATIVE: 1
NAUSEA: 0

## 2021-12-30 ASSESSMENT — FIBROSIS 4 INDEX: FIB4 SCORE: 1.46

## 2021-12-30 NOTE — CARE PLAN
The patient is Watcher - Medium risk of patient condition declining or worsening    Shift Goals  Clinical Goals: Stable neuro exam; BP control  Patient Goals: pain control; rest  Family Goals: get better    Progress made toward(s) clinical / shift goals:    Problem: Knowledge Deficit - Standard  Goal: Patient and family/care givers will demonstrate understanding of plan of care, disease process/condition, diagnostic tests and medications  Outcome: Progressing     Problem: Pain - Standard  Goal: Alleviation of pain or a reduction in pain to the patient’s comfort goal  Outcome: Progressing     Problem: Neuro Status  Goal: Neuro status will remain stable or improve  Outcome: Progressing     Problem: Skin Integrity  Goal: Skin integrity is maintained or improved  Outcome: Progressing     Problem: Fall Risk  Goal: Patient will remain free from falls  Outcome: Progressing       Patient is not progressing towards the following goals:

## 2021-12-30 NOTE — PROGRESS NOTES
Dr. Merino at bedside to review repeat CT scan with patient.   Patient given time to evaluate options that Dr. Merino presented regarding surgery or no surgery at this time.

## 2021-12-30 NOTE — THERAPY
Speech Language Pathology   Initial Assessment     Patient Name: Will Walker  AGE:  61 y.o., SEX:  male  Medical Record #: [id number]  Today's Date: 12/30/2021     Precautions: Fall Risk    Assessment    Patient is 61 y.o. male admitted 12/29/21 s/p injuries sustained after the was bucked off a hoarse ~ a month ago.    CT of head 12/30/21:  1.  Mild interval increase in right hemispheric subdural hematoma, measuring 17 mm, present 14 mm.  2.  Similar right-to-left midline shift (8 mm).  3.  Similar mild medial displacement of the right uncus.  4.  Partial effacement of the right lateral ventricle with near total effacement of the occipital horn.  5.  No new intracranial hemorrhage.    Patient was seen on this date for a cognitive-linguistic evaluation. Patient sleeping and kept eyes closed majority of session although was verbally responsive. Speech intact and AAOx4. Patient reports working full time as a  and was independent with IADLs. Pt currently reports mild HA and stated he feels 100% when compared to baseline.     Portions of the COGNISTAT (Neurobehavioral Cognitive Status Assess) and other measures were administered.     Patient scored the following on the Cognistat:  Orientation: WNL  Attention: WNL  Comprehension (Language): WNL  Repetition (Language): WNL  Naming (Language): WNL  Memory: MILD  Calculations: WNL  Similarities (Reasoning): WNL  Judgment (Reasoning): WNL     Further testing revealed performance on the following domains was WNL: clock drawing, reading comprehension, writing, medication management, and written arithmetic. Education provided to patient regarding current status and SLP recs. Concussion handout provided.     Recommendations  Patient appears at or close to baseline in regards to cognition. No further SLP services indicated. However, may benefit from outpatient SLP services if pt feels he is not at his PLOF once resuming normal IADLs.     Plan    Recommend Speech Therapy  for Evaluation only for the following treatments:  Cognitive-Linguistic Training and Patient / Family / Caregiver Education.    Discharge Recommendations: Recommend outpatient speech therapy services     Objective       12/30/21 1133   Vitals   O2 (LPM) 3   O2 Delivery Device Silicone Nasal Cannula   Prior Level Of Function   Communication Within Functional Limits   Hearing Within Functional Limits for Evaluation   Hearing Aid None   Vision Within Functional Limits for Evaluation   Patient's Primary Language English   Education Completed College   Education Years Completed 20   Occupation (Pre-Hospital Vocational) Employed Full Time  (vet)   Verbal Expression   Verbal Expression / Aphasia Eval (WDL) WDL   Auditory Comprehension   Auditory Comprehension (WDL) WDL   Reading Comprehension   Reading Comprehension (WDL) WDL   Written Expression   Written Expression (WDL) WDL   Cognitive-Linguistic   Level of Consciousness Alert   Orientation Level Oriented x 4   Short Term Memory Supervision (5)   Comments All other domains were WNL

## 2021-12-30 NOTE — PROGRESS NOTES
"Trauma Progress Note 12/30/2021 2:12 AM    This is a 61 y.o. male with a history of a fall from a horse at the end of November. 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   The patient is on warfarin for history of mitral valve repair. He was given [facility]ra at [facility] prior to his transfer to [facility].    Plan:   - repeat head CT in AM pending   - possible surgical intervention this afternoon    Assessment: awake, alert, GCS 15.    /70   Pulse (!) 55   Temp 36.3 °C (97.4 °F) (Temporal)   Resp 20   Ht 1.854 m (6' 1\")   Wt 119 kg (262 lb 5.6 oz)   SpO2 95%   BMI 34.61 kg/m²     Hemoglobin: 13.3 g/dL  Hematocrit: 40.8 %    Urine Output: voiding adequate output    IMAGING:  OUTSIDE IMAGES-CT HEAD   Final Result      OUTSIDE IMAGES-DX CHEST   Final Result      CT-HEAD W/O    (Results Pending)     Recent Labs     12/29/21  0833 12/29/21  1140   APTT 43.6* 39.8*   INR 2.17 1.23*      Recent Labs     12/29/21  1140   REACTMIN 8.5   CLOTKINET 1.8   CLOTANGL 68.3   MAXCLOTS 59.8   PRCINADP 6.7   PRCINAA 39.4*     * SDH (subdural hematoma) (HCC)- (present on admission)  Assessment & Plan  SDH with a shift.  Definitive plan pending. Possible surgery 12/30.  Post traumatic pharmacologic seizure prophylaxis for 1 week.  Speech Language Pathology cognitive evaluation.  Dilip Merino MD. Neurosurgeon. [facility].    AF (atrial fibrillation) (HCC)- (present on admission)  Assessment & Plan  Chronic condition treated with warfarin and diltiazem.  Systemic anticoagulation held on admission.    Lumbar transverse process fracture, closed, initial encounter (HCC)- (present on admission)  Assessment & Plan  L5 transverse process fracture.  Non operative management.  Analgesia.    H/O mitral valve repair- (present on admission)  Assessment & Plan  Anticoagulated with Coumadin.  Hold maintenance medication during acute traumatic illness.     Encounter for screening for COVID-19- " (present on admission)  Assessment & Plan  Admission SARS-CoV-2 testing negative. Repeat SARS-CoV-2 testing not indicated. Isolation precautions de-escalated.    Trauma- (present on admission)  Assessment & Plan  Remote trauma.  Trauma Green Transfer Activation.  Rashida Wood MD. Trauma Surgery.

## 2021-12-30 NOTE — PROGRESS NOTES
Dr. Merino Neurosurgery paged for questions/orders.    2042: Call returned by [facility] PA, orders received for Keppra 500mg BID PO as patient has orders for NPO sips with medications.

## 2021-12-30 NOTE — PROGRESS NOTES
Dr. Merino at bedside to re-evaluate patient decision to proceed with surgery or not.   Patient at this point is wanting to watch and wait without a surgical approach.   MD explained in detail about the pros and cons of surgical intervention as well as what to expect if patient decides to not proceed with surgery.   Diet order and neuro checks updated in orders.

## 2021-12-30 NOTE — PROGRESS NOTES
Trauma / Surgical Daily Progress Note    Date of Service  12/30/2021    Chief Complaint  61 y.o. male admitted 12/29/2021 as a green transfer - Bucked off horse ~ a month ago - Acute on chronic ICH on Coumadin    Interval Events  Tertiary complete - no further findings  RAP/SBIRT complete  Minimal head ache controled with Fioricet   No n/v  Therapies pending   Neurosurgical recommendations reviewed   Patient choosing conservative management at this time   Steroids initiated   Cardiology consult note pending regarding anticoagulation  Transfer to [facility]    Anticipate home in AM     Review of Systems  Review of Systems   Constitutional: Positive for malaise/fatigue.   HENT: Negative.    Respiratory: Negative.    Gastrointestinal: Negative for nausea and vomiting.        BM PTA   Genitourinary:        Voiding   Musculoskeletal: Positive for myalgias.   Neurological: Positive for headaches. Negative for dizziness.   Psychiatric/Behavioral: Negative.    All other systems reviewed and are negative.       Vital Signs  Temp:  [36.2 °C (97.2 °F)-37 °C (98.6 °F)] 36.7 °C (98.1 °F)  Pulse:  [47-70] 62  Resp:  [10-35] 19  BP: (124-169)/(59-84) 169/74  SpO2:  [88 %-96 %] 94 %    Physical Exam  Physical Exam  Vitals and nursing note reviewed.   Constitutional:       General: He is not in acute distress.     Appearance: Normal appearance. He is obese.   HENT:      Right Ear: External ear normal.      Left Ear: External ear normal.      Nose: Nose normal.      Mouth/Throat:      Mouth: Mucous membranes are moist.      Pharynx: Oropharynx is clear.   Eyes:      General:         Right eye: No discharge.         Left eye: No discharge.      Pupils: Pupils are equal, round, and reactive to light.   Cardiovascular:      Pulses: Normal pulses.   Pulmonary:      Effort: Pulmonary effort is normal. No respiratory distress.      Breath sounds: Normal breath sounds.   Abdominal:      General: There is no distension.      Palpations:  Abdomen is soft.      Tenderness: There is no abdominal tenderness.   Musculoskeletal:         General: Tenderness (lumbar ) present.      Cervical back: Normal range of motion. No rigidity. No muscular tenderness.   Skin:     General: Skin is warm and dry.      Capillary Refill: Capillary refill takes less than 2 seconds.      Coloration: Skin is not pale.   Neurological:      General: No focal deficit present.      Mental Status: He is alert and oriented to person, place, and time.      GCS: GCS eye subscore is 4. GCS verbal subscore is 5. GCS motor subscore is 6.   Psychiatric:         Mood and Affect: Mood normal.         Behavior: Behavior normal.         Thought Content: Thought content normal.         Laboratory  Recent Results (from the past 24 hour(s))   PLATELET MAPPING WITH BASIC TEG    Collection Time: 12/29/21 11:40 AM   Result Value Ref Range    Reaction Time Initial-R 8.5 4.6 - 9.1 min    React Time Initial Hep 6.9 4.3 - 8.3 min    Clot Kinetics-K 1.8 0.8 - 2.1 min    Clot Angle-Angle 68.3 63.0 - 78.0 degrees    Maximum Clot Strength-MA 59.8 52.0 - 69.0 mm    TEG Functional Fibrinogen(MA) 19.2 15.0 - 32.0 mm    % Inhibition ADP 6.7 0.0 - 17.0 %    % Inhibition AA 39.4 (H) 0.0 - 11.0 %    TEG Algorithm Link Algorithm    Prothrombin Time    Collection Time: 12/29/21 11:40 AM   Result Value Ref Range    PT 15.2 (H) 12.0 - 14.6 sec    INR 1.23 (H) 0.87 - 1.13   PTT    Collection Time: 12/29/21 11:40 AM   Result Value Ref Range    APTT 39.8 (H) 24.7 - 36.0 sec   POCT glucose device results    Collection Time: 12/30/21 12:02 AM   Result Value Ref Range    Glucose - Accu-Ck 89 65 - 99 mg/dL   CBC with Differential: Tomorrow AM    Collection Time: 12/30/21  4:30 AM   Result Value Ref Range    WBC 4.3 (L) 4.8 - 10.8 K/uL    RBC 4.42 (L) 4.70 - 6.10 M/uL    Hemoglobin 13.3 (L) 14.0 - 18.0 g/dL    Hematocrit 40.8 (L) 42.0 - 52.0 %    MCV 92.3 81.4 - 97.8 fL    MCH 30.1 27.0 - 33.0 pg    MCHC 32.6 (L) 33.7 - 35.3  g/dL    RDW 43.3 35.9 - 50.0 fL    Platelet Count 143 (L) 164 - 446 K/uL    MPV 11.0 9.0 - 12.9 fL    Neutrophils-Polys 56.80 44.00 - 72.00 %    Lymphocytes 25.10 22.00 - 41.00 %    Monocytes 13.50 (H) 0.00 - 13.40 %    Eosinophils 3.50 0.00 - 6.90 %    Basophils 0.90 0.00 - 1.80 %    Immature Granulocytes 0.20 0.00 - 0.90 %    Nucleated RBC 0.00 /100 WBC    Neutrophils (Absolute) 2.45 1.82 - 7.42 K/uL    Lymphs (Absolute) 1.08 1.00 - 4.80 K/uL    Monos (Absolute) 0.58 0.00 - 0.85 K/uL    Eos (Absolute) 0.15 0.00 - 0.51 K/uL    Baso (Absolute) 0.04 0.00 - 0.12 K/uL    Immature Granulocytes (abs) 0.01 0.00 - 0.11 K/uL    NRBC (Absolute) 0.00 K/uL   Comp Metabolic Panel (CMP): Tomorrow AM    Collection Time: 12/30/21  4:30 AM   Result Value Ref Range    Sodium 139 135 - 145 mmol/L    Potassium 4.3 3.6 - 5.5 mmol/L    Chloride 104 96 - 112 mmol/L    Co2 25 20 - 33 mmol/L    Anion Gap 10.0 7.0 - 16.0    Glucose 88 65 - 99 mg/dL    Bun 18 8 - 22 mg/dL    Creatinine 0.91 0.50 - 1.40 mg/dL    Calcium 8.8 8.5 - 10.5 mg/dL    AST(SGOT) 14 12 - 45 U/L    ALT(SGPT) 17 2 - 50 U/L    Alkaline Phosphatase 65 30 - 99 U/L    Total Bilirubin 0.8 0.1 - 1.5 mg/dL    Albumin 4.1 3.2 - 4.9 g/dL    Total Protein 6.4 6.0 - 8.2 g/dL    Globulin 2.3 1.9 - 3.5 g/dL    A-G Ratio 1.8 g/dL   Magnesium: Every Monday and Thursday AM    Collection Time: 12/30/21  4:30 AM   Result Value Ref Range    Magnesium 2.2 1.5 - 2.5 mg/dL   Phosphorus: Every Monday and Thursday AM    Collection Time: 12/30/21  4:30 AM   Result Value Ref Range    Phosphorus 3.9 2.5 - 4.5 mg/dL   ESTIMATED GFR    Collection Time: 12/30/21  4:30 AM   Result Value Ref Range    GFR If African American >60 >60 mL/min/1.73 m 2    GFR If Non African American >60 >60 mL/min/1.73 m 2   POCT glucose device results    Collection Time: 12/30/21  5:19 AM   Result Value Ref Range    Glucose - Accu-Ck 88 65 - 99 mg/dL   Prothrombin Time    Collection Time: 12/30/21  8:00 AM   Result Value  Ref Range    PT 13.8 12.0 - 14.6 sec    INR 1.09 0.87 - 1.13       Fluids    Intake/Output Summary (Last 24 hours) at 12/30/2021 0959  Last data filed at 12/30/2021 0800  Gross per 24 hour   Intake 210 ml   Output 850 ml   Net -640 ml       Core Measures & Quality Metrics  Medications reviewed, Labs reviewed and Radiology images reviewed  Valencia catheter: No Valencia      DVT Prophylaxis: Contraindicated - High bleeding risk  DVT prophylaxis - mechanical: SCDs  Ulcer prophylaxis: Not indicated    Assessed for rehab: Patient was assess for and/or received rehabilitation services during this hospitalization    RAP Score Total: 7    ETOH Screening     Assessment complete date: 12/30/2021        Assessment/Plan  * SDH (subdural hematoma) (HCC)- (present on admission)  Assessment & Plan  SDH with a shift.  Patient declining surgery.   Post traumatic pharmacologic seizure prophylaxis for 1 week.  Speech Language Pathology cognitive evaluation.  Dexamethasone 2 mg BID x 7 days.  Follow up outpatient in 2 weeks with repeat head CT.  Dilip Merino MD. Neurosurgeon. [facility].    Anticoagulated- (present on admission)  Assessment & Plan  Takes warfarin.  Reversed at [facility] with [facility].  Hold warfarin indefinitely.  Cardiology consult note pending (spoke with cardiologist 12/30 @ 0866).      AF (atrial fibrillation) ([facility])- (present on admission)  Assessment & Plan  Chronic condition treated with warfarin and diltiazem.  Systemic anticoagulation held on admission.   12/30 Resumed diltiazem.    Lumbar transverse process fracture, closed, initial encounter ([facility])- (present on admission)  Assessment & Plan  L5 transverse process fracture.  Non operative management.  Analgesia.    H/O mitral valve repair- (present on admission)  Assessment & Plan  Anticoagulated with Coumadin.  Hold warfarin indefinitely.  Cardiology consult note pending (spoke with cardiologist 12/30 @ 0821).    Trauma- (present on admission)  Assessment &  Plan  Remote trauma.  Trauma Green Transfer Activation.  Rashida Wood MD. Trauma Surgery.          IMAGING:  CT-HEAD W/O   Final Result      1.  Mild interval increase in right hemispheric subdural hematoma, measuring 17 mm, present 14 mm.   2.  Similar right-to-left midline shift (8 mm).   3.  Similar mild medial displacement of the right uncus.   4.  Partial effacement of the right lateral ventricle with near total effacement of the occipital horn.   5.  No new intracranial hemorrhage.      OUTSIDE IMAGES-CT HEAD   Final Result      OUTSIDE IMAGES-DX CHEST   Final Result          All current laboratory studies/radiology exams reviewed: Yes    Completed Consultations:  Neurosurgery     Pending Consultations:  None    Newly Identified Diagnoses and Injuries:  None    Discussed patient condition with RN, Patient and Dr. Yousif .

## 2021-12-30 NOTE — CARE PLAN
The patient is Watcher - Medium risk of patient condition declining or worsening    Shift Goals  Clinical Goals: SBP <160; Stable Neuro Exams Q1 hour  Patient Goals: Pain control; Rest  Family Goals: No family at bedside    Progress made toward(s) clinical / shift goals:    Problem: Pain - Standard  Goal: Alleviation of pain or a reduction in pain to the patient’s comfort goal  Outcome: Progressing     Problem: Neuro Status  Goal: Neuro status will remain stable or improve  Outcome: Progressing  Note: Performing Q1 hour neuro assessments, patient tolerating assessments well, no deficits noted at this time. Plan for repeat head CT at 0500, CT reported availability at that time.     Problem: Hemodynamics  Goal: Patient's hemodynamics, fluid balance and neurologic status will be stable or improve  Outcome: Progressing  Note: Patient goal for SBP <160, PRN medications on MAR, providing pharm and non-pharm interventions to maintain SBP goal. Patient restarted on home medication.       Patient is not progressing towards the following goals:

## 2021-12-30 NOTE — ASSESSMENT & PLAN NOTE
Takes warfarin.  Reversed at [facility] with CALEB Dempsey.  Hold warfarin indefinitely.  Cardiology consult note pending (spoke with cardiologist 12/30 @ 4597).

## 2021-12-30 NOTE — PROGRESS NOTES
0450: Patient transported to CT via wheelchair on monitor with ACLS RN and CCT. Patient tolerated procedure well. VSS.    0503: Patient transported back to S122, tolerated trip well, VSS. No further needs at this time.

## 2021-12-30 NOTE — PROGRESS NOTES
Neurosurgery Progress Note    Subjective:  60 yo male s/p fall off horse on  with progressive MORRISON recently.  CT shows right SDH.  Patient reports HA today, but otherwise ok.  Patient has indicated to Dr. Merino that he does not want surgery.     Exam:  A&O x3  CN 2-12 grossly intact.  Moves extremities x4  EOMI, PERRL  Motor grossly intact.     BP  Min: 124/59  Max: 169/74  Pulse  Av.8  Min: 47  Max: 70  Resp  Av.7  Min: 10  Max: 34  Temp  Av.5 °C (97.7 °F)  Min: 36.2 °C (97.2 °F)  Max: 37 °C (98.6 °F)  SpO2  Av.1 %  Min: 88 %  Max: 96 %    No data recorded    Recent Labs     21  0833 21  0430   WBC 6.6 4.3*   RBC 5.21 4.42*   HEMOGLOBIN 15.7 13.3*   HEMATOCRIT 47.0 40.8*   MCV 90.2 92.3   MCH 30.1 30.1   MCHC 33.4 32.6*   RDW 12.5 43.3   PLATELETCT 169 143*   MPV 10.8* 11.0     Recent Labs     21  0833 21  0430   SODIUM 137 139   POTASSIUM 4.1 4.3   CHLORIDE 101 104   CO2 24 25   GLUCOSE 108* 88   BUN 17 18   CREATININE 1.1 0.91   CALCIUM 9.0 8.8     Recent Labs     21  0833 21  1140 21  0800   APTT 43.6* 39.8*  --    INR 2.17 1.23* 1.09     Recent Labs     21  1140   REACTMIN 8.5   CLOTKINET 1.8   CLOTANGL 68.3   MAXCLOTS 59.8   PRCINADP 6.7   PRCINAA 39.4*       Intake/Output                       21 07 - 21 0659 21 07 - 21 0659     [id number] Total [id number] Total                 Intake    Other  --  210 210  --  -- --    Medications (PO/Enteral Liquids) -- 210 210 -- -- --    Total Intake -- 210 210 -- -- --       Output    Urine  450  400 850  --  -- --    Number of Times Voided 2 x 2 x 4 x -- -- --    Urine Void (mL) 450 400 850 -- -- --    Stool  --  -- --  --  -- --    Number of Times Stooled -- 0 x 0 x -- -- --    Total Output 450 400 850 -- -- --       Net I/O     -450 -190 -640 -- -- --            Intake/Output Summary (Last 24 hours) at 2021 0899  Last data filed at  12/30/2021 0600  Gross per 24 hour   Intake 210 ml   Output 850 ml   Net -640 ml            • labetalol  10 mg Q4HRS PRN   • Respiratory Therapy Consult   Continuous RT   • Pharmacy Consult Request  1 Each PHARMACY TO DOSE   • ondansetron  4 mg Q4HRS PRN   • ondansetron  4 mg Q4HRS PRN   • docusate sodium  100 mg BID   • senna-docusate  1 Tablet Nightly   • senna-docusate  1 Tablet Q24HRS PRN   • polyethylene glycol/lytes  1 Packet BID   • magnesium hydroxide  30 mL DAILY   • bisacodyl  10 mg Q24HRS PRN   • sodium phosphate  1 Each Once PRN   • acetaminophen  1,000 mg Q6HRS    Followed by   • [START ON 1/3/2022] acetaminophen  1,000 mg Q6HRS PRN   • oxyCODONE immediate-release  5 mg Q3HRS PRN    Or   • oxyCODONE immediate-release  10 mg Q3HRS PRN    Or   • HYDROmorphone  0.5 mg Q3HRS PRN   • butalbital/apap/caffeine -40 mg  1 Tablet Q6HRS PRN   • hydrALAZINE  20 mg Q6HRS PRN   • DILTIAZem  30 mg TWICE DAILY   • levETIRAcetam  500 mg BID       Assessment and Plan:  Hospital day #2  Patient/neuro stable.  Patient is refusing surgery.  Dr. Merino is recommending dexamethasone 2 mg PO BID for 1 week.  Needs f/u with Dr. Merino/Kapil in 2 weeks with updated CT head no contrast performed at that time.  Ok for d/c home today per NS if ok with intensivists.  May reconsult NS as needed.    Prophylactic anticoagulation: no         Start date/time: not indicated.

## 2021-12-31 ENCOUNTER — PATIENT OUTREACH (OUTPATIENT)
Dept: HEALTH INFORMATION MANAGEMENT | Facility: OTHER | Age: 61
End: 2021-12-31

## 2021-12-31 VITALS
HEART RATE: 59 BPM | OXYGEN SATURATION: 94 % | BODY MASS INDEX: 34.83 KG/M2 | DIASTOLIC BLOOD PRESSURE: 75 MMHG | SYSTOLIC BLOOD PRESSURE: 137 MMHG | HEIGHT: 73 IN | RESPIRATION RATE: 16 BRPM | TEMPERATURE: 97.3 F | WEIGHT: 262.79 LBS

## 2021-12-31 PROCEDURE — 700102 HCHG RX REV CODE 250 W/ 637 OVERRIDE(OP): Performed by: SURGERY

## 2021-12-31 PROCEDURE — 700102 HCHG RX REV CODE 250 W/ 637 OVERRIDE(OP): Performed by: NURSE PRACTITIONER

## 2021-12-31 PROCEDURE — A9270 NON-COVERED ITEM OR SERVICE: HCPCS | Performed by: NURSE PRACTITIONER

## 2021-12-31 PROCEDURE — 700102 HCHG RX REV CODE 250 W/ 637 OVERRIDE(OP): Performed by: PHYSICIAN ASSISTANT

## 2021-12-31 PROCEDURE — A9270 NON-COVERED ITEM OR SERVICE: HCPCS | Performed by: PHYSICIAN ASSISTANT

## 2021-12-31 PROCEDURE — RXMED WILLOW AMBULATORY MEDICATION CHARGE: Performed by: NURSE PRACTITIONER

## 2021-12-31 PROCEDURE — A9270 NON-COVERED ITEM OR SERVICE: HCPCS | Performed by: SURGERY

## 2021-12-31 PROCEDURE — 99238 HOSP IP/OBS DSCHRG MGMT 30/<: CPT | Performed by: SURGERY

## 2021-12-31 RX ORDER — LEVETIRACETAM 500 MG/1
500 TABLET ORAL 2 TIMES DAILY
Qty: 10 TABLET | Refills: 0 | Status: SHIPPED | OUTPATIENT
Start: 2021-12-31 | End: 2022-01-06

## 2021-12-31 RX ORDER — ACETAMINOPHEN 500 MG
1000 TABLET ORAL EVERY 6 HOURS
Qty: 30 TABLET | Refills: 0 | Status: SHIPPED | OUTPATIENT
Start: 2021-12-31

## 2021-12-31 RX ORDER — DEXAMETHASONE 2 MG/1
2 TABLET ORAL 2 TIMES DAILY
Qty: 10 TABLET | Refills: 0 | Status: SHIPPED | OUTPATIENT
Start: 2021-12-31 | End: 2022-01-06

## 2021-12-31 RX ORDER — BUTALBITAL, ACETAMINOPHEN AND CAFFEINE 50; 325; 40 MG/1; MG/1; MG/1
1-2 TABLET ORAL EVERY 6 HOURS PRN
Qty: 20 TABLET | Refills: 0 | Status: SHIPPED | OUTPATIENT
Start: 2021-12-31 | End: 2022-01-07

## 2021-12-31 RX ADMIN — ACETAMINOPHEN 1000 MG: 500 TABLET ORAL at 11:06

## 2021-12-31 RX ADMIN — ACETAMINOPHEN 1000 MG: 500 TABLET ORAL at 04:07

## 2021-12-31 RX ADMIN — LEVETIRACETAM 500 MG: 500 TABLET, FILM COATED ORAL at 04:08

## 2021-12-31 RX ADMIN — BUTALBITAL, ACETAMINOPHEN, AND CAFFEINE 1 TABLET: 50; 325; 40 TABLET ORAL at 11:06

## 2021-12-31 RX ADMIN — DEXAMETHASONE 2 MG: 1 TABLET ORAL at 04:08

## 2021-12-31 RX ADMIN — DILTIAZEM HYDROCHLORIDE 30 MG: 30 TABLET, FILM COATED ORAL at 04:08

## 2021-12-31 ASSESSMENT — PAIN DESCRIPTION - PAIN TYPE: TYPE: ACUTE PAIN;CHRONIC PAIN

## 2021-12-31 NOTE — PROGRESS NOTES
Report given to KRISTY Blevins. Pt transported to [facility] via wheelchair with transport. All belongings placed in patient belonging bags and brought with patient to new room

## 2021-12-31 NOTE — CARE PLAN
The patient is Stable - Low risk of patient condition declining or worsening    Shift Goals  Clinical Goals: Pain control  Patient Goals: Rest  Family Goals: MARCELINO    Progress made toward(s) clinical / shift goals:  Assumed care of patient at 2200. Pt AxOx4. Q4hr neuro checks are in place. Bed is low and locked, bed alarm is on, call light is within reach. Pt calls appropriately.     Patient is not progressing towards the following goals: Pt remains on 2L of oxygen, baseline is room air. Pt saturation in low 90s to high 80s.

## 2021-12-31 NOTE — CONSULTS
Reason for Consult:  Asked by Dr Rashida Wood M.D. to see this patient with subdural hematoma with history of mitral valve replacement on warfarin with SUSHILA munoz  Patient's PCP: Alfredito Vela M.D.    CC:   Chief Complaint   Patient presents with   • Headache     Pt bib EMS from [facility] after it was discovered he had a right subdural hematoma after falling off a horse on Thanksgiving day. Pt is on warfarin from a mitral valve replacement 4 years ago, warfarin was reversed at previous facility with [facility]. Pt was on cardene upon arrival. Pt complains of 4/10 headache, pt received IV tylenol from EMS. Pt also has a L5 transverse fracture that does not require surgery.        HPI: Is a pleasant 61-year-old gentleman with history of mitral valve replacement with a bioprosthetic valve at [facility] with perioperative atrial fibrillation who presents with fall from horse and hand found to have a subdural hematoma and is warfarin was appropriately reversed and I was asked to comment on appropriateness of holding warfarin.  He follows typically with Dr. Patino at [facility] and those records reviewed    Medications / Drug list prior to admission:  No current facility-administered medications on file prior to encounter.     Current Outpatient Medications on File Prior to Encounter   Medication Sig Dispense Refill   • Probiotic Product (PROBIOTIC PO) Take 1 Tablet by mouth every day.     • acetaminophen (TYLENOL) 500 MG Tab Take 500 mg by mouth every 6 hours as needed for Mild Pain (Headache).     • DILTIAZem (CARDIZEM) 30 MG Tab Take 30 mg by mouth 2 times a day.     • simvastatin (ZOCOR) 5 MG Tab Take 5 mg by mouth every evening.     • aspirin EC (ECOTRIN) 81 MG Tablet Delayed Response Take 81 mg by mouth every day.         Current list of administered Medications:    Current Facility-Administered Medications:   •  dexamethasone (DECADRON) tablet 2 mg, 2 mg, Oral, BID, Debra Heart, A.P.N., 2 mg at 12/30/21  1801  •  simvastatin (ZOCOR) tablet 5 mg, 5 mg, Oral, Nightly, Debra Heart, A.P.N.  •  labetalol (NORMODYNE/TRANDATE) injection 10 mg, 10 mg, Intravenous, Q4HRS PRN, Rashida Wood M.D., 10 mg at 12/29/21 1609  •  Respiratory Therapy Consult, , Nebulization, Continuous RT, Rashida Wood M.D.  •  ondansetron (ZOFRAN ODT) dispertab 4 mg, 4 mg, Oral, Q4HRS PRN, Rashida Wood M.D.  •  docusate sodium (COLACE) capsule 100 mg, 100 mg, Oral, BID, Rashida Wood M.D.  •  senna-docusate (PERICOLACE or SENOKOT S) 8.6-50 MG per tablet 1 Tablet, 1 Tablet, Oral, Nightly, Rashida Wood M.D., 1 Tablet at 12/29/21 2103  •  senna-docusate (PERICOLACE or SENOKOT S) 8.6-50 MG per tablet 1 Tablet, 1 Tablet, Oral, Q24HRS PRN, Rashida Wood M.D.  •  polyethylene glycol/lytes (MIRALAX) PACKET 1 Packet, 1 Packet, Oral, BID, Rashida Wood M.D., 1 Packet at 12/30/21 1740  •  magnesium hydroxide (MILK OF MAGNESIA) suspension 30 mL, 30 mL, Oral, DAILY, Rashida Wood M.D.  •  bisacodyl (DULCOLAX) suppository 10 mg, 10 mg, Rectal, Q24HRS PRN, Rashida Wood M.D.  •  sodium phosphate (Fleet) enema 133 mL, 1 Each, Rectal, Once PRN, Rashida Wood M.D.  •  acetaminophen (TYLENOL) tablet 1,000 mg, 1,000 mg, Oral, Q6HRS, 1,000 mg at 12/29/21 1813 **FOLLOWED BY** [START ON 1/3/2022] acetaminophen (TYLENOL) tablet 1,000 mg, 1,000 mg, Oral, Q6HRS PRN, Rashida Wood M.D.  •  oxyCODONE immediate-release (ROXICODONE) tablet 5 mg, 5 mg, Oral, Q3HRS PRN, 5 mg at 12/30/21 1742 **OR** oxyCODONE immediate release (ROXICODONE) tablet 10 mg, 10 mg, Oral, Q3HRS PRN **OR** [DISCONTINUED] HYDROmorphone (Dilaudid) injection 0.5 mg, 0.5 mg, Intravenous, Q3HRS PRN, Rashida Wood M.D.  •  butalbital/apap/caffeine -40 mg (Fioricet) per tablet 1 Tablet, 1 Tablet, Oral, Q6HRS PRN, Rashida Wood M.D., 1 Tablet at 12/30/21 1547  •  DILTIAZem (CARDIZEM) tablet 30 mg, 30 mg, Oral, TWICE DAILY, Rashida Wood M.D., 30  mg at 12/30/21 1741  •  levETIRAcetam (KEPPRA) tablet 500 mg, 500 mg, Oral, BID, ERIN LandrumAPAULINE, 500 mg at 12/30/21 1743    Past Medical History:   Diagnosis Date   • No known health problems    • Primary pulmonary HTN (HCC)        Past Surgical History:   Procedure Laterality Date   • CORONART ARTERY BYPASS, 2  11/2017   • OTHER      mitral valve replaced       No family history on file.  Patient family history was personally reviewed, no pertinent family history to current presentation    Social History     Tobacco Use   • Smoking status: Never Smoker   • Smokeless tobacco: Former User     Types: Chew   Vaping Use   • Vaping Use: Never used   Substance Use Topics   • Alcohol use: Not on file   • Drug use: No       ALLERGIES:  No Known Allergies    Review of systems:  A complete review of symptoms was reviewed with patient . This is reviewed in H&P and PMH. ALL OTHERS reviewed and negative    Physical exam:  Patient Vitals for the past 24 hrs:   BP Temp Temp src Pulse Resp SpO2 Weight   12/30/21 1742 147/69 -- -- 60 -- 94 % --   12/30/21 1600 147/69 37.1 °C (98.7 °F) Temporal 67 20 89 % --   12/30/21 1400 -- -- -- 64 (!) 33 93 % --   12/30/21 1308 -- -- -- 63 -- 94 % --   12/30/21 1200 124/53 36.9 °C (98.5 °F) Temporal 67 20 94 % --   12/30/21 1100 121/58 -- -- (!) 59 19 97 % --   12/30/21 1000 132/58 36.8 °C (98.3 °F) Temporal 64 (!) 21 91 % --   12/30/21 0900 141/65 -- -- 70 20 94 % --   12/30/21 0841 (!) 169/74 -- -- 62 19 94 % --   12/30/21 0800 (!) 167/71 36.7 °C (98.1 °F) Temporal 63 (!) 35 95 % --   12/30/21 0700 137/65 -- -- (!) 55 18 94 % --   12/30/21 0600 137/63 36.8 °C (98.3 °F) Temporal (!) 50 16 94 % --   12/30/21 0515 -- -- -- (!) 51 -- -- --   12/30/21 0500 150/65 -- -- (!) 57 (!) 30 91 % --   12/30/21 0400 155/67 37 °C (98.6 °F) Temporal (!) 50 17 95 % 119 kg (262 lb 12.6 oz)   12/30/21 0300 130/60 -- -- (!) 49 15 94 % --   12/30/21 0200 152/70 36.3 °C (97.4 °F) Temporal (!) 55 20 95 %  --   12/30/21 0100 124/59 -- -- (!) 48 14 93 % --   12/30/21 0000 135/63 36.4 °C (97.6 °F) Temporal (!) 47 15 93 % --   12/29/21 2300 138/62 -- -- (!) 50 (!) 10 96 % --   12/29/21 2200 143/65 36.2 °C (97.2 °F) Temporal (!) 58 (!) 22 92 % --   12/29/21 2100 153/73 -- -- (!) 55 (!) 26 94 % --   12/29/21 2000 145/66 36.3 °C (97.4 °F) Temporal (!) 56 17 89 % --   12/29/21 1900 (!) 163/68 -- -- (!) 59 (!) 26 91 % --     General: No acute distress.   EYES: no jaundice  HEENT: OP clear   Neck:  No JVD.   CVS: Regular  Resp: Normal respiratory effort  Abdomen: Soft, ND,  Skin: Grossly nothing acute no obvious rashes  Neurological: Alert, Moves all extremities, no cranial nerve defects on limited exam  Extremities: No edema. No cyanosis.       Data:  Laboratory studies personally reviewed by me:  Recent Results (from the past 24 hour(s))   POCT glucose device results    Collection Time: 12/30/21 12:02 AM   Result Value Ref Range    Glucose - Accu-Ck 89 65 - 99 mg/dL   CBC with Differential: Tomorrow AM    Collection Time: 12/30/21  4:30 AM   Result Value Ref Range    WBC 4.3 (L) 4.8 - 10.8 K/uL    RBC 4.42 (L) 4.70 - 6.10 M/uL    Hemoglobin 13.3 (L) 14.0 - 18.0 g/dL    Hematocrit 40.8 (L) 42.0 - 52.0 %    MCV 92.3 81.4 - 97.8 fL    MCH 30.1 27.0 - 33.0 pg    MCHC 32.6 (L) 33.7 - 35.3 g/dL    RDW 43.3 35.9 - 50.0 fL    Platelet Count 143 (L) 164 - 446 K/uL    MPV 11.0 9.0 - 12.9 fL    Neutrophils-Polys 56.80 44.00 - 72.00 %    Lymphocytes 25.10 22.00 - 41.00 %    Monocytes 13.50 (H) 0.00 - 13.40 %    Eosinophils 3.50 0.00 - 6.90 %    Basophils 0.90 0.00 - 1.80 %    Immature Granulocytes 0.20 0.00 - 0.90 %    Nucleated RBC 0.00 /100 WBC    Neutrophils (Absolute) 2.45 1.82 - 7.42 K/uL    Lymphs (Absolute) 1.08 1.00 - 4.80 K/uL    Monos (Absolute) 0.58 0.00 - 0.85 K/uL    Eos (Absolute) 0.15 0.00 - 0.51 K/uL    Baso (Absolute) 0.04 0.00 - 0.12 K/uL    Immature Granulocytes (abs) 0.01 0.00 - 0.11 K/uL    NRBC (Absolute) 0.00 K/uL    Comp Metabolic Panel (CMP): Tomorrow AM    Collection Time: 12/30/21  4:30 AM   Result Value Ref Range    Sodium 139 135 - 145 mmol/L    Potassium 4.3 3.6 - 5.5 mmol/L    Chloride 104 96 - 112 mmol/L    Co2 25 20 - 33 mmol/L    Anion Gap 10.0 7.0 - 16.0    Glucose 88 65 - 99 mg/dL    Bun 18 8 - 22 mg/dL    Creatinine 0.91 0.50 - 1.40 mg/dL    Calcium 8.8 8.5 - 10.5 mg/dL    AST(SGOT) 14 12 - 45 U/L    ALT(SGPT) 17 2 - 50 U/L    Alkaline Phosphatase 65 30 - 99 U/L    Total Bilirubin 0.8 0.1 - 1.5 mg/dL    Albumin 4.1 3.2 - 4.9 g/dL    Total Protein 6.4 6.0 - 8.2 g/dL    Globulin 2.3 1.9 - 3.5 g/dL    A-G Ratio 1.8 g/dL   Magnesium: Every Monday and Thursday AM    Collection Time: 12/30/21  4:30 AM   Result Value Ref Range    Magnesium 2.2 1.5 - 2.5 mg/dL   Phosphorus: Every Monday and Thursday AM    Collection Time: 12/30/21  4:30 AM   Result Value Ref Range    Phosphorus 3.9 2.5 - 4.5 mg/dL   ESTIMATED GFR    Collection Time: 12/30/21  4:30 AM   Result Value Ref Range    GFR If African American >60 >60 mL/min/1.73 m 2    GFR If Non African American >60 >60 mL/min/1.73 m 2   POCT glucose device results    Collection Time: 12/30/21  5:19 AM   Result Value Ref Range    Glucose - Accu-Ck 88 65 - 99 mg/dL   Prothrombin Time    Collection Time: 12/30/21  8:00 AM   Result Value Ref Range    PT 13.8 12.0 - 14.6 sec    INR 1.09 0.87 - 1.13       Imaging:  CT-HEAD W/O   Final Result      1.  Mild interval increase in right hemispheric subdural hematoma, measuring 17 mm, present 14 mm.   2.  Similar right-to-left midline shift (8 mm).   3.  Similar mild medial displacement of the right uncus.   4.  Partial effacement of the right lateral ventricle with near total effacement of the occipital horn.   5.  No new intracranial hemorrhage.      OUTSIDE IMAGES-CT HEAD   Final Result      OUTSIDE IMAGES-DX CHEST   Final Result            CARDIOVASCULAR TESTS, STUDIES AND PROCEDURES     ANA: EF 65-70%, moderate LVH, moderate MVP,  moderate size vegetation on the mitral valve, mild to moderate MR, trace AR, mild TR, 05/25/2017   ECHO: mild LVH, LVEF 60-65%, moderate MVP, moderate MR, mild to moderate AR, RVSP 35-40 mmHg, vegetation appears smaller 06/29/2017   ECHO: normal LV/RV function, moderate MVP, moderate MR, mild to moderate AR, moderate size veg on posterior mitral leaflet, moderate RVSP 45-50 mmHg. 11/2017   Holter: NSR 11/2017   MVR (31 mm Emmanuel-Steiner bio), CABGx2 (LIMA to LAD and VG to Diag) 12/2017   LHC: 30% dLM, oLAD 90%, oDiag 70%, mCx 30%, mRCA 20-30%, EF 55% 12/2017 8/20/18 ECHO: EF 45-50%. mild-mod LVH. Severely LAE. mod-severe CHRIS. well-seated bioprosthetic MV. no pericardial effusion      Telemetry shows sinus bradycardia    Principal Problem:    SDH (subdural hematoma) ([facility]) POA: Yes  Active Problems:    AF (atrial fibrillation) ([facility]) POA: Yes    Trauma POA: Yes    H/O mitral valve repair POA: Yes    Lumbar transverse process fracture, closed, initial encounter ([facility]) POA: Yes    Anticoagulated POA: Yes  Resolved Problems:    Encounter for screening for COVID-19 POA: Yes      Assessment / Plan:  Mitral valve replacement with paroxysmal atrial fibrillation it seems he had some atrial fibrillation not immediately after surgery so certainly would be typically advised anticoagulation, his A. fib burden has been less than so hopefully the stroke risk is less,    Given the subdural is clearly advised to hold anticoagulation and baby aspirin for likely a month with follow-up on his subdural hematoma.    We discussed he could consider watchman he was not clear if he had a left atrial appendage ligation at the time of his surgery I do not have those immediate records but encouraged him to follow-up with his primary cardiologist if left atrial appendage ligation was done otherwise consider watchvirgie    We also discussed he could consider a loop recorder to understand his continued A. fib burden perhaps if that remains low not  go back to anticoagulation after his subdural hematoma has healed he will discuss with his primary cardiologist    Cardiology will sign off    It is my pleasure to participate in the care of Mr. Walker.  Please do not hesitate to contact me with questions or concerns.    Colin Hicks MD PhD [facility]  Cardiologist [facility]    12/30/2021    Please note that this dictation was created using voice recognition software. There may be errors I did not discover before finalizing the note.

## 2021-12-31 NOTE — DISCHARGE PLANNING
[facility] TCN chart review completed. Collaborated with bedside nursing. The most current review of medical record, knowledge of pt's PLOF and social support, LACE+ score of 64, 6 clicks scores of 24 mobility were considered.  Pt seen at bedside and note is up self in room without AD and very pleasant and cooperative. Introduced TCN program to pt and provided education with regards to dc planning considerations. Pt reports no functional concerns with dc to home once medically cleared and is planning to dc to home soon with wife providing transport. Pt inquiring with possible short term disability benefits as MD requested that pt not return to work for a month or so. Sent email with pt request for information to Customer service leads at [facility] for follow up with member. No choice forms obtained as pt appears given aforementioned information. Did not introduce [facility] services as pt lives outside service area and does not appear to have needs at this time. TCN will continue to follow and collaborate with discharge planning team as additional post acute needs arise, though anticipate dc to home with family today. Thank you.

## 2021-12-31 NOTE — DISCHARGE INSTRUCTIONS
Discharge Instructions    Discharged to home by car with relative. Discharged via wheelchair, hospital escort: Yes.  Special equipment needed: Not Applicable    Be sure to schedule a follow-up appointment with your primary care doctor or any specialists as instructed.     Discharge Plan:   Diet Plan: Discussed  Activity Level: Discussed  Confirmed Follow up Appointment: Patient to Call and Schedule Appointment  Confirmed Symptoms Management: Discussed  Medication Reconciliation Updated: Yes  Influenza Vaccine Indication: Indicated: 9 to 64 years of age    I understand that a diet low in cholesterol, fat, and sodium is recommended for good health. Unless I have been given specific instructions below for another diet, I accept this instruction as my diet prescription.   Other diet: Regular heart healthy diet    Special Instructions:   - Call or seek medical attention for questions or concerns  - Follow up with Dr. Merino  in 1-2 weeks time. Call his private office to schedule follow up.   - Follow up with primary care provider within one weeks time. Review your home medication with your primary care doctor.    - Resume regular diet  - May take over the counter acetaminophen.Avoid NSAIDS, Aspirin, Ibuprofen if you have been advise to do so by your Neurosurgeon.   - Continue daily over the counter stool softener while on narcotics  - No operation of machinery or motorized vehicles while under the influence of narcotics  - No alcohol, marijuana or illicit drug use while under the influence of narcotics  - In the event of a narcotic overdose naloxone (Narcan) is available without a prescription from any [facility]  - Hospital staff are unable to refill your pain medication. You will need to contact your PCP or surgeon's office for refills  - No swimming, hot tubs, baths or wound submersion until cleared by outpatient provider. May shower  - No contact sports, strenuous activities, or heavy lifting until  cleared by outpatient provider  - If respiratory decompensation, persistent or worsening pain, fever or signs or symptoms of infection occur seek medical attention          · Is patient discharged on Warfarin / Coumadin?   No     Depression / Suicide Risk    As you are discharged from this [facility] facility, it is important to learn how to keep safe from harming yourself.    Recognize the warning signs:  · Abrupt changes in personality, positive or negative- including increase in energy   · Giving away possessions  · Change in eating patterns- significant weight changes-  positive or negative  · Change in sleeping patterns- unable to sleep or sleeping all the time   · Unwillingness or inability to communicate  · Depression  · Unusual sadness, discouragement and loneliness  · Talk of wanting to die  · Neglect of personal appearance   · Rebelliousness- reckless behavior  · Withdrawal from people/activities they love  · Confusion- inability to concentrate     If you or a loved one observes any of these behaviors or has concerns about self-harm, here's what you can do:  · Talk about it- your feelings and reasons for harming yourself  · Remove any means that you might use to hurt yourself (examples: pills, rope, extension cords, firearm)  · Get professional help from the community (Mental Health, Substance Abuse, psychological counseling)  · Do not be alone:Call your Safe Contact- someone whom you trust who will be there for you.  · Call your local CRISIS HOTLINE 805-1057 or 400-508-7621  · Call your local Children's Mobile Crisis Response Team [place] (699) 896-9942 or www.Delectable  · Call the toll free National Suicide Prevention Hotlines   · National Suicide Prevention Lifeline 378-138-XQGS (2372)  · National Hope Line Network 800-SUICIDE (487-0439)        Subdural Hematoma    A subdural hematoma is a collection of blood between the brain and its outer covering (dura). As the amount of blood  increases, pressure builds on the brain.  There are two types of subdural hematomas:  · Acute. This type develops shortly after a hard, direct hit to the head and causes blood to collect very quickly. This is a medical emergency. If it is not diagnosed and treated quickly, it can lead to severe brain injury or death.  · Chronic. This is when bleeding develops more slowly, over weeks or months. In some cases, this type does not cause symptoms.  What are the causes?  This condition is caused by bleeding (hemorrhage) from a broken (ruptured) blood vessel. In most cases, a blood vessel ruptures and bleeds because of a head injury, such as from a hard, direct hit. Head injuries can happen in car accidents, falls, assaults, or while playing sports.  In rare cases, a hemorrhage can happen without a known cause (spontaneously), especially if you take blood thinners (anticoagulants).  What increases the risk?  This condition is more likely to develop in:  · Older people.  · Infants.  · People who take blood thinners.  · People who have head injuries.  · People who abuse alcohol.  What are the signs or symptoms?  Symptoms of this condition can vary depending on the size of the hematoma. Symptoms can be mild, severe, or life-threatening. They include:  · Headaches.  · Nausea or vomiting.  · Changes in vision, such as double vision or loss of vision.  · Changes in speech or trouble understanding what people say.  · Loss of balance or trouble walking.  · Weakness, numbness, or tingling in the arms or legs, especially on one side of the body.  · Seizures.  · Change in personality.  · Increased sleepiness.  · Memory loss.  · Loss of consciousness.  · Coma.  Symptoms of acute subdural hematoma can develop over minutes or hours. Symptoms of chronic subdural hematoma may develop over weeks or months.  How is this diagnosed?  This condition is diagnosed based on the results of:  · A physical exam.  · Tests of strength, reflexes,  coordination, senses, manner of walking (gait), and facial and eye movements (neurological exam).  · Imaging tests, such as an MRI or a CT scan.  How is this treated?  Treatment for this condition depends on the type of hematoma and how severe it is.  Treatment for acute hematoma may include:  · Emergency surgery to drain blood or remove a blood clot.  · Medicines that help the body get rid of excess fluids (diuretics). These may help to reduce pressure in the brain.  · Assisted breathing (ventilation).  Treatment for chronic hematoma may include:  · Observation and bed rest at the hospital.  · Surgery.  If you take blood thinners, you may need to stop taking them for a short time. You may also be given anti-seizure (anticonvulsant) medicine.  Sometimes, no treatment is needed for chronic subdural hematoma.  Follow these instructions at home:  Activity  · Avoid situations where you could injure your head again, such as in competitive sports, downhill snow sports, and horseback riding. Do not do these activities until your health care provider approves.  ? Wear protective gear, such as a helmet, when participating in activities such as biking or contact sports.  · Avoid too much visual stimulation while recovering. This means limiting how much you read and limiting your screen time on a smart phone, tablet, computer, or TV.  · Rest as told by your health care provider. Rest helps the brain heal.  · Try to avoid activities that cause physical or mental stress. Return to work or school as told by your health care provider.  · Do not lift anything that is heavier than 5 lb (2.3 kg), or the limit you are told, until your health care provider says that it is safe.  · Do not drive, ride a bike, or use heavy machinery until your health care provider approves.  · Always wear your seat belt when you are in a motor vehicle.  Alcohol use  · Do not drink alcohol if your health care provider tells you not to drink.  · If you  drink alcohol, limit how much you use to:  ? 0-1 drink a day for women.  ? 0-2 drinks a day for men.  General instructions  · Monitor your symptoms, and ask people around you to do the same. Recovery from brain injuries varies. Talk with your health care provider about what to expect.  · Take over-the-counter and prescription medicines only as told by your health care provider. Do not take blood thinners or NSAIDs unless your health care provider approves. These include aspirin, ibuprofen, naproxen, and warfarin.  · Keep your home environment safe to reduce the risk of falling.  · Keep all follow-up visits as told by your health care provider. This is important.  Where to find more information  · National [place] of Neurological Disorders and Stroke: www.ninds.nih.gov  · American Academy of Neurology (AAN): www.aan.com  · Brain Injury Association of [place]: www.biausa.org  Get help right away if you:  · Are taking blood thinners and you fall or you experience minor trauma to the head. If you take any blood thinners, even a very small injury can cause a subdural hematoma.  · Have a bleeding disorder and you fall or you experience minor trauma to the head.  · Develop any of the following symptoms after a head injury:  ? Clear fluid draining from your nose or ears.  ? Nausea or vomiting.  ? Changes in speech or trouble understanding what people say.  ? Seizures.  ? Drowsiness or a decrease in alertness.  ? Double vision.  ? Numbness or inability to move (paralysis) in any part of your body.  ? Difficulty walking or poor coordination.  ? Difficulty thinking.  ? Confusion or forgetfulness.  ? Personality changes.  ? Irrational or aggressive behavior.  These symptoms may represent a serious problem that is an emergency. Do not wait to see if the symptoms will go away. Get medical help right away. Call your local emergency services (911 in the [place] Do not drive yourself to the hospital.  Summary  · A subdural hematoma  is a collection of blood between the brain and its outer covering (dura).  · Treatment for this condition depends on what type of subdural hematoma you have and how severe it is.  · Symptoms can vary from mild to severe to life-threatening.  · Monitor your symptoms, and ask others around you to do the same.  This information is not intended to replace advice given to you by your health care provider. Make sure you discuss any questions you have with your health care provider.  Document Released: 11/04/2005 Document Revised: 11/18/2019 Document Reviewed: 11/18/2019  Elsevier Patient Education © 2020 Elsevier Inc.

## 2021-12-31 NOTE — PROGRESS NOTES
Pt arrived via wheelchair by transport. Arrived with all belongings. Pt oriented to room and call light. Bed alarm in place, phone and call light within reach.

## 2021-12-31 NOTE — PROGRESS NOTES
Patient transferred out of ICU to cruz last night.  Patient states he feels safe to go home today.    Medication sent to meds to beds  Cognitive exam completed  Patient will follow up with his primary care doctor on discharge and with Dr. Merino.  Avoid NSAIDs, aspirin and anticoagulation until cleared by neurosurgery

## 2021-12-31 NOTE — CARE PLAN
The patient is Stable - Low risk of patient condition declining or worsening    Shift Goals  Clinical Goals: SBP<160, pain control   Patient Goals: Pain control, eat food  Family Goals: N/A    Progress made toward(s) clinical / shift goals:  PRN BP medication for SBP >160mmHg.   Offer pain medication for increased headache and comfort needs.   Patient mobilizing well.   Cog evaluation performed at bedside.   Neurosurgery ok with patient DC, plan to stay overnight one more night for observation.       Patient is not progressing towards the following goals:      Problem: Pain - Standard  Goal: Alleviation of pain or a reduction in pain to the patient’s comfort goal  Outcome: Not Progressing     Problem: Self Care  Goal: Patient will have the ability to perform ADLs independently or with assistance (bathe, groom, dress, toilet and feed)  Outcome: Not Progressing     Problem: Hemodynamics  Goal: Patient's hemodynamics, fluid balance and neurologic status will be stable or improve  Outcome: Not Progressing

## 2021-12-31 NOTE — DISCHARGE SUMMARY
Trauma Discharge Summary    DATE OF ADMISSION: 12/29/2021    DATE OF DISCHARGE: 12/31/2021      ATTENDING PHYSICIAN: Rashida Wood M.D.    CONSULTING PHYSICIAN:   1. Dilip Merino MD neurosurgery  2.  Colin Hicks MD cardiology    DISCHARGE DIAGNOSIS:  Principal Problem:    SDH (subdural hematoma) (HCC) POA: Yes  Active Problems:    AF (atrial fibrillation) (HCC) POA: Yes    Anticoagulated POA: Yes    H/O mitral valve repair POA: Yes    Lumbar transverse process fracture, closed, initial encounter ([facility]) POA: Yes    Trauma POA: Yes  Resolved Problems:    Encounter for screening for COVID-19 POA: Yes      PROCEDURES:  1.  None    HISTORY OF PRESENT ILLNESS: The patient is a 61 y.o. male who was reportedly injured when he was bucked off a horse approximately a month ago.  He has had multiple severe headaches since that time and presented to [facility] where a CT showed a chronic right subdural.  He was given Kcentra for his Coumadin and especially his friends house at this point.  Please bring my car back to my house was transferred to [facility] in [place], [place].    HOSPITAL COURSE: The patient was triaged as a partial trauma transfer activation. The patient was transported to intensive care unit and ultimately to the neuro cruz.  Patient is aware not to take his Coumadin until he has been cleared by neurosurgery.  Patient was offered the opportunity to have a surgical intervention for his chronic subdural however patient at this time is refusing surgery and would like to follow-up as an outpatient.  Patient will be placed on seizure prophylaxis follow-up with Dr. Merino as an outpatient.    HOSPITAL PROBLEM LIST:  * SDH (subdural hematoma) (HCC)- (present on admission)  Assessment & Plan  SDH with a shift.  Patient declining surgery.   Post traumatic pharmacologic seizure prophylaxis for 1 week.  Speech Language Pathology cognitive evaluation.  Dexamethasone 2 mg  BID x 7 days.  Follow up outpatient in 2 weeks with repeat head CT.  Dilip Merino MD. Neurosurgeon. [facility] [place].    Anticoagulated- (present on admission)  Assessment & Plan  Takes warfarin.  Reversed at [facility] with K Centra.  Hold warfarin indefinitely.  Cardiology consult note pending (spoke with cardiologist 12/30 @ 0852).      AF (atrial fibrillation) ([facility])- (present on admission)  Assessment & Plan  Chronic condition treated with warfarin and diltiazem.  Systemic anticoagulation held on admission.   12/30 Resumed diltiazem.    Lumbar transverse process fracture, closed, initial encounter ([facility])- (present on admission)  Assessment & Plan  L5 transverse process fracture.  Non operative management.  Analgesia.    H/O mitral valve repair- (present on admission)  Assessment & Plan  Anticoagulated with Coumadin.  Hold warfarin indefinitely.  Cardiology consult note pending (spoke with cardiologist 12/30 @ 0852).    Trauma- (present on admission)  Assessment & Plan  Remote trauma.  Trauma Green Transfer Activation.  Rashida Wood MD. Trauma Surgery.    Encounter for screening for COVID-19-resolved as of 12/30/2021, (present on admission)  Assessment & Plan  Admission SARS-CoV-2 testing negative. Repeat SARS-CoV-2 testing not indicated. Isolation precautions de-escalated.        DISCHARGE PHYSICAL EXAM: See [facility] physical exam dated 12/31/2021    DISPOSITION: Discharged home on 12/31/2021. The patient and family were counseled and questions were answered. Specifically, signs and symptoms of infection, respiratory decompensation, avoiding anticoagulants and persistent or worsening pain were discussed and the patient agrees to seek medical attention if any of these develop.    DISCHARGE MEDICATIONS:  The patients controlled substance history was reviewed and a controlled substance use informed consent (if applicable) was provided by [facility] and the patient has been prescribed.     Medication  List      START taking these medications      Instructions   butalbital/apap/caffeine -40 mg -40 MG Tabs  Commonly known as: Fioricet   Take 1-2 Tablets by mouth every 6 hours as needed for Headache or Migraine for up to 7 days.  Dose: 1-2 Tablet     dexamethasone 2 MG tablet  Commonly known as: DECADRON   Take 1 Tablet by mouth 2 times a day for 5 days.  Dose: 2 mg     levETIRAcetam 500 MG Tabs  Commonly known as: KEPPRA   Take 1 Tablet by mouth 2 times a day for 5 days.  Dose: 500 mg        CHANGE how you take these medications      Instructions   acetaminophen 500 MG Tabs  What changed:   · how much to take  · when to take this  · reasons to take this  Commonly known as: TYLENOL   Take 2 Tablets by mouth every 6 hours.  Dose: 1,000 mg        CONTINUE taking these medications      Instructions   DILTIAZem 30 MG Tabs  Commonly known as: CARDIZEM   Take 30 mg by mouth 2 times a day.  Dose: 30 mg     PROBIOTIC PO   Take 1 Tablet by mouth every day.  Dose: 1 Tablet     simvastatin 5 MG Tabs  Commonly known as: ZOCOR   Take 5 mg by mouth every evening.  Dose: 5 mg        STOP taking these medications    aspirin EC 81 MG Tbec  Commonly known as: ECOTRIN            ACTIVITY:  Head injury information      WOUND CARE:  NA    DIET:  Orders Placed This Encounter   Procedures   • Diet Order Diet: Regular     Standing Status:   Standing     Number of Occurrences:   1     Order Specific Question:   Diet:     Answer:   Regular [1]       FOLLOW UP:  Dilip Merino M.D.  [address]  [address]  [facility] [id number] [phone]          Alfredito Vela M.D.  [id number]  [facility] [address]  [phone]          Sebastian Patino M.D.  [address]  [facility] [id number] [phone]            TIME SPENT ON DISCHARGE: 30 minutes      ____________________________________________  CYNTHIA Urias    DD: 12/31/2021 9:57 AM

## 2022-01-01 ENCOUNTER — PHARMACY VISIT (OUTPATIENT)
Dept: PHARMACY | Facility: MEDICAL CENTER | Age: 62
End: 2022-01-01
Payer: COMMERCIAL

## 2022-02-02 ENCOUNTER — PRE-ADMISSION TESTING (OUTPATIENT)
Dept: ADMISSIONS | Facility: MEDICAL CENTER | Age: 62
DRG: 026 | End: 2022-02-02
Attending: NEUROLOGICAL SURGERY
Payer: COMMERCIAL

## 2022-02-02 ENCOUNTER — HOSPITAL ENCOUNTER (OUTPATIENT)
Dept: RADIOLOGY | Facility: MEDICAL CENTER | Age: 62
DRG: 026 | End: 2022-02-02
Attending: NEUROLOGICAL SURGERY | Admitting: NEUROLOGICAL SURGERY
Payer: COMMERCIAL

## 2022-02-02 DIAGNOSIS — Z01.810 PRE-OPERATIVE CARDIOVASCULAR EXAMINATION: ICD-10-CM

## 2022-02-02 DIAGNOSIS — Z01.811 PRE-OPERATIVE RESPIRATORY EXAMINATION: ICD-10-CM

## 2022-02-02 DIAGNOSIS — Z01.812 PRE-OPERATIVE LABORATORY EXAMINATION: ICD-10-CM

## 2022-02-02 LAB
ANION GAP SERPL CALC-SCNC: 13 MMOL/L (ref 7–16)
APPEARANCE UR: CLEAR
APTT PPP: 30.1 SEC (ref 24.7–36)
BASOPHILS # BLD AUTO: 0.6 % (ref 0–1.8)
BASOPHILS # BLD: 0.04 K/UL (ref 0–0.12)
BILIRUB UR QL STRIP.AUTO: NEGATIVE
BUN SERPL-MCNC: 24 MG/DL (ref 8–22)
CALCIUM SERPL-MCNC: 9.3 MG/DL (ref 8.5–10.5)
CHLORIDE SERPL-SCNC: 106 MMOL/L (ref 96–112)
CO2 SERPL-SCNC: 24 MMOL/L (ref 20–33)
COLOR UR: YELLOW
CREAT SERPL-MCNC: 0.91 MG/DL (ref 0.5–1.4)
EKG IMPRESSION: NORMAL
EOSINOPHIL # BLD AUTO: 0.08 K/UL (ref 0–0.51)
EOSINOPHIL NFR BLD: 1.1 % (ref 0–6.9)
ERYTHROCYTE [DISTWIDTH] IN BLOOD BY AUTOMATED COUNT: 38.7 FL (ref 35.9–50)
GLUCOSE SERPL-MCNC: 99 MG/DL (ref 65–99)
GLUCOSE UR STRIP.AUTO-MCNC: NEGATIVE MG/DL
HCT VFR BLD AUTO: 45.7 % (ref 42–52)
HGB BLD-MCNC: 15.6 G/DL (ref 14–18)
IMM GRANULOCYTES # BLD AUTO: 0.02 K/UL (ref 0–0.11)
IMM GRANULOCYTES NFR BLD AUTO: 0.3 % (ref 0–0.9)
INR PPP: 0.98 (ref 0.87–1.13)
KETONES UR STRIP.AUTO-MCNC: NEGATIVE MG/DL
LEUKOCYTE ESTERASE UR QL STRIP.AUTO: NEGATIVE
LYMPHOCYTES # BLD AUTO: 0.92 K/UL (ref 1–4.8)
LYMPHOCYTES NFR BLD: 12.7 % (ref 22–41)
MCH RBC QN AUTO: 29.7 PG (ref 27–33)
MCHC RBC AUTO-ENTMCNC: 34.1 G/DL (ref 33.7–35.3)
MCV RBC AUTO: 86.9 FL (ref 81.4–97.8)
MICRO URNS: NORMAL
MONOCYTES # BLD AUTO: 0.68 K/UL (ref 0–0.85)
MONOCYTES NFR BLD AUTO: 9.4 % (ref 0–13.4)
NEUTROPHILS # BLD AUTO: 5.48 K/UL (ref 1.82–7.42)
NEUTROPHILS NFR BLD: 75.9 % (ref 44–72)
NITRITE UR QL STRIP.AUTO: NEGATIVE
NRBC # BLD AUTO: 0 K/UL
NRBC BLD-RTO: 0 /100 WBC
PH UR STRIP.AUTO: 5.5 [PH] (ref 5–8)
PLATELET # BLD AUTO: 167 K/UL (ref 164–446)
PMV BLD AUTO: 11.1 FL (ref 9–12.9)
POTASSIUM SERPL-SCNC: 4.3 MMOL/L (ref 3.6–5.5)
PROT UR QL STRIP: NEGATIVE MG/DL
PROTHROMBIN TIME: 12.7 SEC (ref 12–14.6)
RBC # BLD AUTO: 5.26 M/UL (ref 4.7–6.1)
RBC UR QL AUTO: NEGATIVE
SARS-COV+SARS-COV-2 AG RESP QL IA.RAPID: NOTDETECTED
SODIUM SERPL-SCNC: 143 MMOL/L (ref 135–145)
SP GR UR STRIP.AUTO: 1.02
SPECIMEN SOURCE: NORMAL
UROBILINOGEN UR STRIP.AUTO-MCNC: 0.2 MG/DL
WBC # BLD AUTO: 7.2 K/UL (ref 4.8–10.8)

## 2022-02-02 PROCEDURE — 81003 URINALYSIS AUTO W/O SCOPE: CPT

## 2022-02-02 PROCEDURE — 71045 X-RAY EXAM CHEST 1 VIEW: CPT

## 2022-02-02 PROCEDURE — 85610 PROTHROMBIN TIME: CPT

## 2022-02-02 PROCEDURE — 85730 THROMBOPLASTIN TIME PARTIAL: CPT

## 2022-02-02 PROCEDURE — 85025 COMPLETE CBC W/AUTO DIFF WBC: CPT

## 2022-02-02 PROCEDURE — 93010 ELECTROCARDIOGRAM REPORT: CPT | Performed by: INTERNAL MEDICINE

## 2022-02-02 PROCEDURE — 36415 COLL VENOUS BLD VENIPUNCTURE: CPT

## 2022-02-02 PROCEDURE — 80048 BASIC METABOLIC PNL TOTAL CA: CPT

## 2022-02-02 PROCEDURE — 93005 ELECTROCARDIOGRAM TRACING: CPT

## 2022-02-02 PROCEDURE — 87426 SARSCOV CORONAVIRUS AG IA: CPT

## 2022-02-02 RX ORDER — LEVETIRACETAM 500 MG/1
500 TABLET ORAL 2 TIMES DAILY
Status: ON HOLD | COMMUNITY
End: 2022-02-07

## 2022-02-02 RX ORDER — LISINOPRIL 20 MG/1
20 TABLET ORAL DAILY
Status: ON HOLD | COMMUNITY
Start: 2022-01-04 | End: 2022-02-07

## 2022-02-03 ENCOUNTER — ANESTHESIA (OUTPATIENT)
Dept: SURGERY | Facility: MEDICAL CENTER | Age: 62
DRG: 026 | End: 2022-02-03
Payer: COMMERCIAL

## 2022-02-03 ENCOUNTER — APPOINTMENT (OUTPATIENT)
Dept: RADIOLOGY | Facility: MEDICAL CENTER | Age: 62
DRG: 026 | End: 2022-02-03
Attending: STUDENT IN AN ORGANIZED HEALTH CARE EDUCATION/TRAINING PROGRAM
Payer: COMMERCIAL

## 2022-02-03 ENCOUNTER — HOSPITAL ENCOUNTER (INPATIENT)
Facility: MEDICAL CENTER | Age: 62
LOS: 4 days | DRG: 026 | End: 2022-02-07
Attending: NEUROLOGICAL SURGERY | Admitting: NEUROLOGICAL SURGERY
Payer: COMMERCIAL

## 2022-02-03 ENCOUNTER — ANESTHESIA EVENT (OUTPATIENT)
Dept: SURGERY | Facility: MEDICAL CENTER | Age: 62
DRG: 026 | End: 2022-02-03
Payer: COMMERCIAL

## 2022-02-03 DIAGNOSIS — S06.5XAA SDH (SUBDURAL HEMATOMA) (HCC): ICD-10-CM

## 2022-02-03 DIAGNOSIS — S06.5XAA SUBDURAL HEMATOMA (HCC): ICD-10-CM

## 2022-02-03 PROBLEM — I16.0 HYPERTENSIVE URGENCY: Status: ACTIVE | Noted: 2022-02-03

## 2022-02-03 PROCEDURE — A9270 NON-COVERED ITEM OR SERVICE: HCPCS | Performed by: NEUROLOGICAL SURGERY

## 2022-02-03 PROCEDURE — 500331 HCHG COTTONOID, SURG PATTIE: Performed by: NEUROLOGICAL SURGERY

## 2022-02-03 PROCEDURE — C1713 ANCHOR/SCREW BN/BN,TIS/BN: HCPCS | Performed by: NEUROLOGICAL SURGERY

## 2022-02-03 PROCEDURE — 160002 HCHG RECOVERY MINUTES (STAT): Performed by: NEUROLOGICAL SURGERY

## 2022-02-03 PROCEDURE — 700111 HCHG RX REV CODE 636 W/ 250 OVERRIDE (IP): Performed by: ANESTHESIOLOGY

## 2022-02-03 PROCEDURE — 160029 HCHG SURGERY MINUTES - 1ST 30 MINS LEVEL 4: Performed by: NEUROLOGICAL SURGERY

## 2022-02-03 PROCEDURE — 009400Z DRAINAGE OF INTRACRANIAL SUBDURAL SPACE WITH DRAINAGE DEVICE, OPEN APPROACH: ICD-10-PCS | Performed by: NEUROLOGICAL SURGERY

## 2022-02-03 PROCEDURE — 700101 HCHG RX REV CODE 250: Performed by: ANESTHESIOLOGY

## 2022-02-03 PROCEDURE — 160036 HCHG PACU - EA ADDL 30 MINS PHASE I: Performed by: NEUROLOGICAL SURGERY

## 2022-02-03 PROCEDURE — 700102 HCHG RX REV CODE 250 W/ 637 OVERRIDE(OP): Performed by: NEUROLOGICAL SURGERY

## 2022-02-03 PROCEDURE — 700101 HCHG RX REV CODE 250: Performed by: NEUROLOGICAL SURGERY

## 2022-02-03 PROCEDURE — 160035 HCHG PACU - 1ST 60 MINS PHASE I: Performed by: NEUROLOGICAL SURGERY

## 2022-02-03 PROCEDURE — 160009 HCHG ANES TIME/MIN: Performed by: NEUROLOGICAL SURGERY

## 2022-02-03 PROCEDURE — 700102 HCHG RX REV CODE 250 W/ 637 OVERRIDE(OP): Performed by: STUDENT IN AN ORGANIZED HEALTH CARE EDUCATION/TRAINING PROGRAM

## 2022-02-03 PROCEDURE — 93005 ELECTROCARDIOGRAM TRACING: CPT | Performed by: STUDENT IN AN ORGANIZED HEALTH CARE EDUCATION/TRAINING PROGRAM

## 2022-02-03 PROCEDURE — 770022 HCHG ROOM/CARE - ICU (200)

## 2022-02-03 PROCEDURE — 500257: Performed by: NEUROLOGICAL SURGERY

## 2022-02-03 PROCEDURE — 700101 HCHG RX REV CODE 250: Performed by: STUDENT IN AN ORGANIZED HEALTH CARE EDUCATION/TRAINING PROGRAM

## 2022-02-03 PROCEDURE — 700105 HCHG RX REV CODE 258: Performed by: NEUROLOGICAL SURGERY

## 2022-02-03 PROCEDURE — C1729 CATH, DRAINAGE: HCPCS | Performed by: NEUROLOGICAL SURGERY

## 2022-02-03 PROCEDURE — 501838 HCHG SUTURE GENERAL: Performed by: NEUROLOGICAL SURGERY

## 2022-02-03 PROCEDURE — 700111 HCHG RX REV CODE 636 W/ 250 OVERRIDE (IP): Performed by: STUDENT IN AN ORGANIZED HEALTH CARE EDUCATION/TRAINING PROGRAM

## 2022-02-03 PROCEDURE — 700111 HCHG RX REV CODE 636 W/ 250 OVERRIDE (IP): Performed by: NEUROLOGICAL SURGERY

## 2022-02-03 PROCEDURE — 160048 HCHG OR STATISTICAL LEVEL 1-5: Performed by: NEUROLOGICAL SURGERY

## 2022-02-03 PROCEDURE — 700101 HCHG RX REV CODE 250: Performed by: INTERNAL MEDICINE

## 2022-02-03 PROCEDURE — 700105 HCHG RX REV CODE 258: Performed by: INTERNAL MEDICINE

## 2022-02-03 PROCEDURE — 700105 HCHG RX REV CODE 258: Performed by: STUDENT IN AN ORGANIZED HEALTH CARE EDUCATION/TRAINING PROGRAM

## 2022-02-03 PROCEDURE — A9270 NON-COVERED ITEM OR SERVICE: HCPCS | Performed by: STUDENT IN AN ORGANIZED HEALTH CARE EDUCATION/TRAINING PROGRAM

## 2022-02-03 PROCEDURE — 110454 HCHG SHELL REV 250: Performed by: NEUROLOGICAL SURGERY

## 2022-02-03 PROCEDURE — 700105 HCHG RX REV CODE 258: Performed by: ANESTHESIOLOGY

## 2022-02-03 PROCEDURE — 99291 CRITICAL CARE FIRST HOUR: CPT | Performed by: INTERNAL MEDICINE

## 2022-02-03 PROCEDURE — 0WJ13ZZ INSPECTION OF CRANIAL CAVITY, PERCUTANEOUS APPROACH: ICD-10-PCS | Performed by: NEUROLOGICAL SURGERY

## 2022-02-03 PROCEDURE — 160041 HCHG SURGERY MINUTES - EA ADDL 1 MIN LEVEL 4: Performed by: NEUROLOGICAL SURGERY

## 2022-02-03 PROCEDURE — 500075 HCHG BLADE, CLIPPER NEURO: Performed by: NEUROLOGICAL SURGERY

## 2022-02-03 PROCEDURE — 500393 HCHG DRAIN, VENTRIC CATH: Performed by: NEUROLOGICAL SURGERY

## 2022-02-03 DEVICE — CATHETER VENTRICLEAR EVD ANTIOBITIC (5EA/PK): Type: IMPLANTABLE DEVICE | Site: BRAIN | Status: FUNCTIONAL

## 2022-02-03 DEVICE — SCREW STRYK NC 1.5X5MM (6NCX40=240) (80EA/PK) CONSIGNED QTY 240 PRE-LOAD: Type: IMPLANTABLE DEVICE | Site: BRAIN | Status: FUNCTIONAL

## 2022-02-03 DEVICE — PLATE SHUNT LARGE GAP 14MM WITH TAB (6NCX2=12): Type: IMPLANTABLE DEVICE | Site: BRAIN | Status: FUNCTIONAL

## 2022-02-03 DEVICE — PLATE NC DOGBONE 2-HOLE RIGID GOLD 0.6MM (6NCX4=24): Type: IMPLANTABLE DEVICE | Site: BRAIN | Status: FUNCTIONAL

## 2022-02-03 DEVICE — GRAFT DURAMATRIX ONLAY PLUS 1IN X 1IN OR 2.7CM X 2.75CM: Type: IMPLANTABLE DEVICE | Site: BRAIN | Status: FUNCTIONAL

## 2022-02-03 RX ORDER — HYDROCODONE BITARTRATE AND ACETAMINOPHEN 10; 325 MG/1; MG/1
1 TABLET ORAL EVERY 4 HOURS PRN
Status: DISCONTINUED | OUTPATIENT
Start: 2022-02-03 | End: 2022-02-06

## 2022-02-03 RX ORDER — HYDRALAZINE HYDROCHLORIDE 20 MG/ML
5 INJECTION INTRAMUSCULAR; INTRAVENOUS
Status: DISCONTINUED | OUTPATIENT
Start: 2022-02-03 | End: 2022-02-03 | Stop reason: HOSPADM

## 2022-02-03 RX ORDER — DEXAMETHASONE SODIUM PHOSPHATE 4 MG/ML
INJECTION, SOLUTION INTRA-ARTICULAR; INTRALESIONAL; INTRAMUSCULAR; INTRAVENOUS; SOFT TISSUE PRN
Status: DISCONTINUED | OUTPATIENT
Start: 2022-02-03 | End: 2022-02-03 | Stop reason: SURG

## 2022-02-03 RX ORDER — OXYCODONE HCL 5 MG/5 ML
10 SOLUTION, ORAL ORAL
Status: DISCONTINUED | OUTPATIENT
Start: 2022-02-03 | End: 2022-02-03 | Stop reason: HOSPADM

## 2022-02-03 RX ORDER — HYDRALAZINE HYDROCHLORIDE 20 MG/ML
INJECTION INTRAMUSCULAR; INTRAVENOUS PRN
Status: DISCONTINUED | OUTPATIENT
Start: 2022-02-03 | End: 2022-02-03 | Stop reason: SURG

## 2022-02-03 RX ORDER — ONDANSETRON 2 MG/ML
4 INJECTION INTRAMUSCULAR; INTRAVENOUS
Status: DISCONTINUED | OUTPATIENT
Start: 2022-02-03 | End: 2022-02-03 | Stop reason: HOSPADM

## 2022-02-03 RX ORDER — LIDOCAINE HYDROCHLORIDE 20 MG/ML
INJECTION, SOLUTION EPIDURAL; INFILTRATION; INTRACAUDAL; PERINEURAL PRN
Status: DISCONTINUED | OUTPATIENT
Start: 2022-02-03 | End: 2022-02-03 | Stop reason: SURG

## 2022-02-03 RX ORDER — DIPHENHYDRAMINE HYDROCHLORIDE 50 MG/ML
12.5 INJECTION INTRAMUSCULAR; INTRAVENOUS
Status: DISCONTINUED | OUTPATIENT
Start: 2022-02-03 | End: 2022-02-03 | Stop reason: HOSPADM

## 2022-02-03 RX ORDER — LABETALOL HYDROCHLORIDE 5 MG/ML
10 INJECTION, SOLUTION INTRAVENOUS
Status: DISCONTINUED | OUTPATIENT
Start: 2022-02-03 | End: 2022-02-03

## 2022-02-03 RX ORDER — HYDRALAZINE HYDROCHLORIDE 20 MG/ML
10 INJECTION INTRAMUSCULAR; INTRAVENOUS
Status: DISCONTINUED | OUTPATIENT
Start: 2022-02-03 | End: 2022-02-03

## 2022-02-03 RX ORDER — CLONIDINE HYDROCHLORIDE 0.1 MG/1
0.1 TABLET ORAL EVERY 4 HOURS PRN
Status: DISCONTINUED | OUTPATIENT
Start: 2022-02-03 | End: 2022-02-07 | Stop reason: HOSPADM

## 2022-02-03 RX ORDER — DIPHENHYDRAMINE HCL 25 MG
25 TABLET ORAL EVERY 6 HOURS PRN
Status: DISCONTINUED | OUTPATIENT
Start: 2022-02-03 | End: 2022-02-07 | Stop reason: HOSPADM

## 2022-02-03 RX ORDER — HYDROMORPHONE HYDROCHLORIDE 1 MG/ML
0.5 INJECTION, SOLUTION INTRAMUSCULAR; INTRAVENOUS; SUBCUTANEOUS
Status: DISCONTINUED | OUTPATIENT
Start: 2022-02-03 | End: 2022-02-06

## 2022-02-03 RX ORDER — SODIUM CHLORIDE, SODIUM LACTATE, POTASSIUM CHLORIDE, CALCIUM CHLORIDE 600; 310; 30; 20 MG/100ML; MG/100ML; MG/100ML; MG/100ML
INJECTION, SOLUTION INTRAVENOUS CONTINUOUS
Status: DISCONTINUED | OUTPATIENT
Start: 2022-02-03 | End: 2022-02-03 | Stop reason: HOSPADM

## 2022-02-03 RX ORDER — POLYETHYLENE GLYCOL 3350 17 G/17G
1 POWDER, FOR SOLUTION ORAL 2 TIMES DAILY PRN
Status: DISCONTINUED | OUTPATIENT
Start: 2022-02-03 | End: 2022-02-07 | Stop reason: HOSPADM

## 2022-02-03 RX ORDER — LISINOPRIL 20 MG/1
20 TABLET ORAL DAILY
Status: DISCONTINUED | OUTPATIENT
Start: 2022-02-03 | End: 2022-02-07 | Stop reason: HOSPADM

## 2022-02-03 RX ORDER — HYDROCODONE BITARTRATE AND ACETAMINOPHEN 5; 325 MG/1; MG/1
1 TABLET ORAL EVERY 4 HOURS PRN
Status: DISCONTINUED | OUTPATIENT
Start: 2022-02-03 | End: 2022-02-06

## 2022-02-03 RX ORDER — HALOPERIDOL 5 MG/ML
1 INJECTION INTRAMUSCULAR
Status: DISCONTINUED | OUTPATIENT
Start: 2022-02-03 | End: 2022-02-03 | Stop reason: HOSPADM

## 2022-02-03 RX ORDER — BISACODYL 10 MG
10 SUPPOSITORY, RECTAL RECTAL
Status: DISCONTINUED | OUTPATIENT
Start: 2022-02-03 | End: 2022-02-07 | Stop reason: HOSPADM

## 2022-02-03 RX ORDER — LEVETIRACETAM 500 MG/5ML
1000 INJECTION, SOLUTION, CONCENTRATE INTRAVENOUS ONCE
Status: COMPLETED | OUTPATIENT
Start: 2022-02-03 | End: 2022-02-03

## 2022-02-03 RX ORDER — OXYCODONE HYDROCHLORIDE AND ACETAMINOPHEN 5; 325 MG/1; MG/1
1 TABLET ORAL EVERY 4 HOURS PRN
Status: DISCONTINUED | OUTPATIENT
Start: 2022-02-03 | End: 2022-02-07 | Stop reason: HOSPADM

## 2022-02-03 RX ORDER — SODIUM CHLORIDE, SODIUM LACTATE, POTASSIUM CHLORIDE, CALCIUM CHLORIDE 600; 310; 30; 20 MG/100ML; MG/100ML; MG/100ML; MG/100ML
INJECTION, SOLUTION INTRAVENOUS CONTINUOUS
Status: ACTIVE | OUTPATIENT
Start: 2022-02-03 | End: 2022-02-03

## 2022-02-03 RX ORDER — BUPIVACAINE HYDROCHLORIDE AND EPINEPHRINE 5; 5 MG/ML; UG/ML
INJECTION, SOLUTION EPIDURAL; INTRACAUDAL; PERINEURAL
Status: DISCONTINUED | OUTPATIENT
Start: 2022-02-03 | End: 2022-02-03 | Stop reason: HOSPADM

## 2022-02-03 RX ORDER — LEVETIRACETAM 500 MG/1
500 TABLET ORAL 2 TIMES DAILY
Status: DISCONTINUED | OUTPATIENT
Start: 2022-02-03 | End: 2022-02-03

## 2022-02-03 RX ORDER — OXYCODONE HCL 5 MG/5 ML
5 SOLUTION, ORAL ORAL
Status: DISCONTINUED | OUTPATIENT
Start: 2022-02-03 | End: 2022-02-03 | Stop reason: HOSPADM

## 2022-02-03 RX ORDER — CEFAZOLIN SODIUM 1 G/3ML
INJECTION, POWDER, FOR SOLUTION INTRAMUSCULAR; INTRAVENOUS PRN
Status: DISCONTINUED | OUTPATIENT
Start: 2022-02-03 | End: 2022-02-03 | Stop reason: SURG

## 2022-02-03 RX ORDER — ENEMA 19; 7 G/133ML; G/133ML
1 ENEMA RECTAL
Status: DISCONTINUED | OUTPATIENT
Start: 2022-02-03 | End: 2022-02-07 | Stop reason: HOSPADM

## 2022-02-03 RX ORDER — MEPERIDINE HYDROCHLORIDE 25 MG/ML
12.5 INJECTION INTRAMUSCULAR; INTRAVENOUS; SUBCUTANEOUS
Status: DISCONTINUED | OUTPATIENT
Start: 2022-02-03 | End: 2022-02-03 | Stop reason: HOSPADM

## 2022-02-03 RX ORDER — PHENYLEPHRINE HYDROCHLORIDE 10 MG/ML
INJECTION, SOLUTION INTRAMUSCULAR; INTRAVENOUS; SUBCUTANEOUS PRN
Status: DISCONTINUED | OUTPATIENT
Start: 2022-02-03 | End: 2022-02-03 | Stop reason: SURG

## 2022-02-03 RX ORDER — ONDANSETRON 2 MG/ML
INJECTION INTRAMUSCULAR; INTRAVENOUS PRN
Status: DISCONTINUED | OUTPATIENT
Start: 2022-02-03 | End: 2022-02-03 | Stop reason: SURG

## 2022-02-03 RX ORDER — DIPHENHYDRAMINE HYDROCHLORIDE 50 MG/ML
25 INJECTION INTRAMUSCULAR; INTRAVENOUS EVERY 6 HOURS PRN
Status: DISCONTINUED | OUTPATIENT
Start: 2022-02-03 | End: 2022-02-07 | Stop reason: HOSPADM

## 2022-02-03 RX ORDER — LEVETIRACETAM 500 MG/5ML
INJECTION, SOLUTION, CONCENTRATE INTRAVENOUS PRN
Status: DISCONTINUED | OUTPATIENT
Start: 2022-02-03 | End: 2022-02-03 | Stop reason: SURG

## 2022-02-03 RX ORDER — LEVETIRACETAM 500 MG/1
750 TABLET ORAL 2 TIMES DAILY
Status: DISCONTINUED | OUTPATIENT
Start: 2022-02-04 | End: 2022-02-05 | Stop reason: CLARIF

## 2022-02-03 RX ORDER — DOCUSATE SODIUM 100 MG/1
100 CAPSULE, LIQUID FILLED ORAL 2 TIMES DAILY
Status: DISCONTINUED | OUTPATIENT
Start: 2022-02-03 | End: 2022-02-07 | Stop reason: HOSPADM

## 2022-02-03 RX ORDER — SIMVASTATIN 20 MG
20 TABLET ORAL NIGHTLY
Status: DISCONTINUED | OUTPATIENT
Start: 2022-02-03 | End: 2022-02-07 | Stop reason: HOSPADM

## 2022-02-03 RX ORDER — BACITRACIN ZINC 500 [USP'U]/G
OINTMENT TOPICAL
Status: DISCONTINUED | OUTPATIENT
Start: 2022-02-03 | End: 2022-02-03 | Stop reason: HOSPADM

## 2022-02-03 RX ORDER — LABETALOL HYDROCHLORIDE 5 MG/ML
5 INJECTION, SOLUTION INTRAVENOUS
Status: DISCONTINUED | OUTPATIENT
Start: 2022-02-03 | End: 2022-02-03 | Stop reason: HOSPADM

## 2022-02-03 RX ORDER — LABETALOL HYDROCHLORIDE 5 MG/ML
10-20 INJECTION, SOLUTION INTRAVENOUS
Status: DISCONTINUED | OUTPATIENT
Start: 2022-02-03 | End: 2022-02-07 | Stop reason: HOSPADM

## 2022-02-03 RX ORDER — HYDRALAZINE HYDROCHLORIDE 20 MG/ML
20 INJECTION INTRAMUSCULAR; INTRAVENOUS
Status: DISCONTINUED | OUTPATIENT
Start: 2022-02-03 | End: 2022-02-07 | Stop reason: HOSPADM

## 2022-02-03 RX ORDER — LABETALOL HYDROCHLORIDE 5 MG/ML
5 INJECTION, SOLUTION INTRAVENOUS
Status: COMPLETED | OUTPATIENT
Start: 2022-02-03 | End: 2022-02-03

## 2022-02-03 RX ORDER — AMOXICILLIN 250 MG
1 CAPSULE ORAL NIGHTLY
Status: DISCONTINUED | OUTPATIENT
Start: 2022-02-03 | End: 2022-02-07 | Stop reason: HOSPADM

## 2022-02-03 RX ORDER — AMOXICILLIN 250 MG
1 CAPSULE ORAL
Status: DISCONTINUED | OUTPATIENT
Start: 2022-02-03 | End: 2022-02-07 | Stop reason: HOSPADM

## 2022-02-03 RX ORDER — ONDANSETRON 2 MG/ML
4 INJECTION INTRAMUSCULAR; INTRAVENOUS EVERY 4 HOURS PRN
Status: DISCONTINUED | OUTPATIENT
Start: 2022-02-03 | End: 2022-02-07 | Stop reason: HOSPADM

## 2022-02-03 RX ORDER — SODIUM CHLORIDE 9 MG/ML
INJECTION, SOLUTION INTRAVENOUS CONTINUOUS
Status: DISCONTINUED | OUTPATIENT
Start: 2022-02-03 | End: 2022-02-03

## 2022-02-03 RX ORDER — SODIUM CHLORIDE, SODIUM LACTATE, POTASSIUM CHLORIDE, CALCIUM CHLORIDE 600; 310; 30; 20 MG/100ML; MG/100ML; MG/100ML; MG/100ML
INJECTION, SOLUTION INTRAVENOUS
Status: DISCONTINUED | OUTPATIENT
Start: 2022-02-03 | End: 2022-02-03 | Stop reason: SURG

## 2022-02-03 RX ADMIN — SODIUM CHLORIDE, POTASSIUM CHLORIDE, SODIUM LACTATE AND CALCIUM CHLORIDE: 600; 310; 30; 20 INJECTION, SOLUTION INTRAVENOUS at 12:27

## 2022-02-03 RX ADMIN — CEFAZOLIN 2 G: 10 INJECTION, POWDER, FOR SOLUTION INTRAVENOUS at 21:52

## 2022-02-03 RX ADMIN — FENTANYL CITRATE 50 MCG: 50 INJECTION, SOLUTION INTRAMUSCULAR; INTRAVENOUS at 16:07

## 2022-02-03 RX ADMIN — LIDOCAINE HYDROCHLORIDE 100 MG: 20 INJECTION, SOLUTION EPIDURAL; INFILTRATION; INTRACAUDAL at 13:56

## 2022-02-03 RX ADMIN — LABETALOL HYDROCHLORIDE 5 MG: 5 INJECTION INTRAVENOUS at 16:45

## 2022-02-03 RX ADMIN — ROCURONIUM BROMIDE 20 MG: 10 INJECTION, SOLUTION INTRAVENOUS at 14:32

## 2022-02-03 RX ADMIN — DILTIAZEM HYDROCHLORIDE 30 MG: 30 TABLET, FILM COATED ORAL at 17:45

## 2022-02-03 RX ADMIN — HYDROCODONE BITARTRATE AND ACETAMINOPHEN 1 TABLET: 10; 325 TABLET ORAL at 17:49

## 2022-02-03 RX ADMIN — FENTANYL CITRATE 25 MCG: 50 INJECTION, SOLUTION INTRAMUSCULAR; INTRAVENOUS at 16:17

## 2022-02-03 RX ADMIN — PHENYLEPHRINE HYDROCHLORIDE 100 MCG: 10 INJECTION INTRAVENOUS at 14:44

## 2022-02-03 RX ADMIN — HYDRALAZINE HYDROCHLORIDE 5 MG: 20 INJECTION INTRAMUSCULAR; INTRAVENOUS at 16:15

## 2022-02-03 RX ADMIN — DOCUSATE SODIUM 50 MG AND SENNOSIDES 8.6 MG 1 TABLET: 8.6; 5 TABLET, FILM COATED ORAL at 21:51

## 2022-02-03 RX ADMIN — HYDRALAZINE HYDROCHLORIDE 5 MG: 20 INJECTION INTRAMUSCULAR; INTRAVENOUS at 16:05

## 2022-02-03 RX ADMIN — NICARDIPINE HYDROCHLORIDE 12.5 MG/HR: 25 INJECTION, SOLUTION INTRAVENOUS at 22:36

## 2022-02-03 RX ADMIN — HYDRALAZINE HYDROCHLORIDE 10 MG: 20 INJECTION, SOLUTION INTRAMUSCULAR; INTRAVENOUS at 17:35

## 2022-02-03 RX ADMIN — SODIUM CHLORIDE, POTASSIUM CHLORIDE, SODIUM LACTATE AND CALCIUM CHLORIDE: 600; 310; 30; 20 INJECTION, SOLUTION INTRAVENOUS at 13:49

## 2022-02-03 RX ADMIN — FENTANYL CITRATE 50 MCG: 50 INJECTION, SOLUTION INTRAMUSCULAR; INTRAVENOUS at 13:53

## 2022-02-03 RX ADMIN — CEFAZOLIN 2 G: 330 INJECTION, POWDER, FOR SOLUTION INTRAMUSCULAR; INTRAVENOUS at 14:05

## 2022-02-03 RX ADMIN — SIMVASTATIN 20 MG: 20 TABLET, FILM COATED ORAL at 21:51

## 2022-02-03 RX ADMIN — LEVETIRACETAM 1000 MG: 100 INJECTION, SOLUTION INTRAVENOUS at 14:19

## 2022-02-03 RX ADMIN — EPHEDRINE SULFATE 5 MG: 50 INJECTION INTRAMUSCULAR; INTRAVENOUS; SUBCUTANEOUS at 14:27

## 2022-02-03 RX ADMIN — DOCUSATE SODIUM 100 MG: 100 CAPSULE, LIQUID FILLED ORAL at 17:45

## 2022-02-03 RX ADMIN — LABETALOL HYDROCHLORIDE 5 MG: 5 INJECTION INTRAVENOUS at 16:23

## 2022-02-03 RX ADMIN — FENTANYL CITRATE 50 MCG: 50 INJECTION, SOLUTION INTRAMUSCULAR; INTRAVENOUS at 14:57

## 2022-02-03 RX ADMIN — PROPOFOL 200 MG: 10 INJECTION, EMULSION INTRAVENOUS at 13:54

## 2022-02-03 RX ADMIN — HYDRALAZINE HYDROCHLORIDE 2 MG: 20 INJECTION INTRAMUSCULAR; INTRAVENOUS at 15:05

## 2022-02-03 RX ADMIN — HYDRALAZINE HYDROCHLORIDE 5 MG: 20 INJECTION INTRAMUSCULAR; INTRAVENOUS at 15:46

## 2022-02-03 RX ADMIN — ONDANSETRON 4 MG: 2 INJECTION INTRAMUSCULAR; INTRAVENOUS at 14:45

## 2022-02-03 RX ADMIN — DEXAMETHASONE SODIUM PHOSPHATE 12 MG: 4 INJECTION, SOLUTION INTRA-ARTICULAR; INTRALESIONAL; INTRAMUSCULAR; INTRAVENOUS; SOFT TISSUE at 14:01

## 2022-02-03 RX ADMIN — HYDROMORPHONE HYDROCHLORIDE 0.5 MG: 1 INJECTION, SOLUTION INTRAMUSCULAR; INTRAVENOUS; SUBCUTANEOUS at 22:34

## 2022-02-03 RX ADMIN — NICARDIPINE HYDROCHLORIDE 5 MG/HR: 25 INJECTION, SOLUTION INTRAVENOUS at 17:46

## 2022-02-03 RX ADMIN — ROCURONIUM BROMIDE 50 MG: 10 INJECTION, SOLUTION INTRAVENOUS at 13:55

## 2022-02-03 RX ADMIN — LISINOPRIL 20 MG: 20 TABLET ORAL at 17:45

## 2022-02-03 RX ADMIN — HYDROCODONE BITARTRATE AND ACETAMINOPHEN 1 TABLET: 10; 325 TABLET ORAL at 21:51

## 2022-02-03 RX ADMIN — LEVETIRACETAM 500 MG: 500 TABLET, FILM COATED ORAL at 17:45

## 2022-02-03 RX ADMIN — HYDRALAZINE HYDROCHLORIDE 2 MG: 20 INJECTION INTRAMUSCULAR; INTRAVENOUS at 14:59

## 2022-02-03 RX ADMIN — HYDRALAZINE HYDROCHLORIDE 10 MG: 20 INJECTION, SOLUTION INTRAMUSCULAR; INTRAVENOUS at 16:46

## 2022-02-03 RX ADMIN — LABETALOL HYDROCHLORIDE 10 MG: 5 INJECTION INTRAVENOUS at 17:40

## 2022-02-03 RX ADMIN — SODIUM CHLORIDE: 9 INJECTION, SOLUTION INTRAVENOUS at 17:38

## 2022-02-03 RX ADMIN — HYDROMORPHONE HYDROCHLORIDE 0.5 MG: 1 INJECTION, SOLUTION INTRAMUSCULAR; INTRAVENOUS; SUBCUTANEOUS at 19:34

## 2022-02-03 RX ADMIN — FENTANYL CITRATE 25 MCG: 50 INJECTION, SOLUTION INTRAMUSCULAR; INTRAVENOUS at 16:32

## 2022-02-03 RX ADMIN — SUGAMMADEX 400 MG: 100 INJECTION, SOLUTION INTRAVENOUS at 14:57

## 2022-02-03 RX ADMIN — MIDAZOLAM 2 MG: 1 INJECTION INTRAMUSCULAR; INTRAVENOUS at 13:49

## 2022-02-03 RX ADMIN — GLYCOPYRROLATE 0.2 MG: 0.2 INJECTION INTRAMUSCULAR; INTRAVENOUS at 14:23

## 2022-02-03 RX ADMIN — FENTANYL CITRATE 100 MCG: 50 INJECTION, SOLUTION INTRAMUSCULAR; INTRAVENOUS at 14:08

## 2022-02-03 RX ADMIN — NICARDIPINE HYDROCHLORIDE 10 MG/HR: 25 INJECTION, SOLUTION INTRAVENOUS at 20:34

## 2022-02-03 RX ADMIN — LEVETIRACETAM 1000 MG: 100 INJECTION, SOLUTION INTRAVENOUS at 22:34

## 2022-02-03 RX ADMIN — LABETALOL HYDROCHLORIDE 10 MG: 5 INJECTION INTRAVENOUS at 23:05

## 2022-02-03 ASSESSMENT — ENCOUNTER SYMPTOMS
SENSORY CHANGE: 1
SORE THROAT: 0
NERVOUS/ANXIOUS: 0
FLANK PAIN: 0
COUGH: 0
FOCAL WEAKNESS: 0
ABDOMINAL PAIN: 0
NECK PAIN: 0
CHILLS: 0
BLURRED VISION: 0
HEADACHES: 1
BRUISES/BLEEDS EASILY: 0
DEPRESSION: 0
VOMITING: 0
FEVER: 0
DOUBLE VISION: 0
PHOTOPHOBIA: 0
NAUSEA: 0
SHORTNESS OF BREATH: 0
DIARRHEA: 0
BACK PAIN: 0
SPEECH CHANGE: 0
TINGLING: 1

## 2022-02-03 ASSESSMENT — PAIN DESCRIPTION - PAIN TYPE
TYPE: SURGICAL PAIN

## 2022-02-03 ASSESSMENT — PAIN SCALES - GENERAL: PAIN_LEVEL: 1

## 2022-02-03 ASSESSMENT — FIBROSIS 4 INDEX
FIB4 SCORE: 1.24
FIB4 SCORE: 1.24

## 2022-02-03 ASSESSMENT — PATIENT HEALTH QUESTIONNAIRE - PHQ9
2. FEELING DOWN, DEPRESSED, IRRITABLE, OR HOPELESS: NOT AT ALL
SUM OF ALL RESPONSES TO PHQ9 QUESTIONS 1 AND 2: 0
1. LITTLE INTEREST OR PLEASURE IN DOING THINGS: NOT AT ALL

## 2022-02-03 NOTE — ADDENDUM NOTE
Addendum  created 02/03/22 1521 by Elver Martin M.D.    Order list changed, Order sets accessed

## 2022-02-03 NOTE — ANESTHESIA POSTPROCEDURE EVALUATION
Patient: Will Walker    Procedure Summary     Date: 02/03/22 Room / Location: [facility] 09 / SURGERY [facility]    Anesthesia Start: 1349 Anesthesia Stop: 1517    Procedure: CREATION,RIGHT CRANIOTOMY FOR EVACUATION OF SUBDURAL HEMATOMA,PLACEMENT OF VENTRICULAR DRAIN (Right Head) Diagnosis: (SUBDURAL HEMATOMA, RIGHT)    Surgeons: Dilip Merino M.D. Responsible Provider: Elver Martin M.D.    Anesthesia Type: general ASA Status: 4 - Emergent          Final Anesthesia Type: general  Last vitals  BP   Blood Pressure: (!) 232/95 (MD aware, no new orders)    Temp   36.3 °C (97.4 °F)    Pulse   79   Resp   18    SpO2   93 %      Anesthesia Post Evaluation    Patient location during evaluation: PACU  Patient participation: complete - patient participated  Level of consciousness: awake and alert  Pain score: 1    Airway patency: patent  Anesthetic complications: no  Cardiovascular status: adequate and hemodynamically stable  Respiratory status: acceptable  Hydration status: acceptable    PONV: none          No complications documented.     Nurse Pain Score: 5 (NPRS)

## 2022-02-03 NOTE — ANESTHESIA PROCEDURE NOTES
Airway    Date/Time: 2/3/2022 2:05 PM  Performed by: Elver Martin M.D.  Authorized by: Elver Martin M.D.     Location:  OR  Urgency:  Elective  Indications for Airway Management:  Anesthesia      Spontaneous Ventilation: absent    Sedation Level:  Deep  Preoxygenated: Yes    Patient Position:  Sniffing  Final Airway Type:  Endotracheal airway  Final Endotracheal Airway:  ETT  Cuffed: Yes    Technique Used for Successful ETT Placement:  Direct laryngoscopy    Insertion Site:  Oral  Blade Type:  Ludwig  Laryngoscope Blade/Videolaryngoscope Blade Size:  2  ETT Size (mm):  7.5  Measured from:  Teeth  ETT to Teeth (cm):  23  Placement Verified by: auscultation and capnometry    Cormack-Lehane Classification:  Grade I - full view of glottis  Number of Attempts at Approach:  1

## 2022-02-03 NOTE — ANESTHESIA PREPROCEDURE EVALUATION
Case: [id number] Date/Time: 02/03/22 1245    Procedure: CREATION, CRANIAL KADEN HOLE-SUBDURAL DRAIN FOR EVACUATION OF SUBDURAL HEMATOMA (Right )    Pre-op diagnosis: SUBDURAL HEMATOMA    Location: [facility] OR 09 / SURGERY [facility]    Surgeons: Dilip Merino M.D.          Relevant Problems   CARDIAC   (positive) AF (atrial fibrillation) (HCC)       Physical Exam    Airway   Mallampati: II  TM distance: >3 FB  Neck ROM: full       Cardiovascular - normal exam  Rhythm: regular  Rate: normal  (-) murmur     Dental - normal exam           Pulmonary - normal exam  Breath sounds clear to auscultation     Abdominal    Neurological - normal exam                 Anesthesia Plan

## 2022-02-03 NOTE — ANESTHESIA PREPROCEDURE EVALUATION
Case: [id number] Date/Time: 02/03/22 1245    Procedure: CREATION, CRANIAL KADEN HOLE-SUBDURAL DRAIN FOR EVACUATION OF SUBDURAL HEMATOMA (Right )    Pre-op diagnosis: SUBDURAL HEMATOMA    Location: [facility] OR 09 / SURGERY [facility]    Surgeons: Dilip Merino M.D.          Relevant Problems   CARDIAC   (positive) AF (atrial fibrillation) (HCC)       Physical Exam    Anesthesia Plan    ASA 4- EMERGENT       Plan - general       Airway plan will be ETT          Induction: intravenous    Postoperative Plan: Postoperative administration of opioids is intended.    Pertinent diagnostic labs and testing reviewed    Informed Consent:    Anesthetic plan and risks discussed with patient.    Use of blood products discussed with: patient whom consented to blood products.

## 2022-02-03 NOTE — ANESTHESIA TIME REPORT
Anesthesia Start and Stop Event Times     Date Time Event    2/3/2022 1314 Ready for Procedure     1349 Anesthesia Start     1517 Anesthesia Stop        Responsible Staff  02/03/22    Name Role Begin End    Elver Martin M.D. Anesth 1349 1517        Preop Diagnosis (Free Text):  Pre-op Diagnosis     SUBDURAL HEMATOMA, RIGHT        Preop Diagnosis (Codes):    Premium Reason  A. 3PM - 7AM    Comments:

## 2022-02-03 NOTE — OR SURGEON
Immediate Post OP Note    PreOp Diagnosis: Right subdural hematoma      PostOp Diagnosis: Same      Procedure(s):  CREATION, RIGHT CRANIOTOMY FOR EVACUATION OF SUBDURAL HEMATOMA,PLACEMENT OF SUBDURAL DRAIN - Wound Class: Clean    Surgeon(s):  Dilip Merino M.D.    Anesthesiologist/Type of Anesthesia:  Anesthesiologist: Elver Martin M.D./General    Surgical Staff:  Assistant: Alejandra Nina P.A.-C.  Circulator: Jamila Granados R.N.  Scrub Person: Francisca Cardona    Specimens removed if any:  * No specimens in log *    Estimated Blood Loss: 50cc    Findings: Patient did well, see OP report. Subdural drain in place, keep reservoir below head of bed.     Complications: None.         2/3/2022 3:14 PM Alejandra Nina P.A.-C.

## 2022-02-04 ENCOUNTER — HOSPITAL ENCOUNTER (OUTPATIENT)
Dept: RADIOLOGY | Facility: MEDICAL CENTER | Age: 62
End: 2022-02-04
Attending: STUDENT IN AN ORGANIZED HEALTH CARE EDUCATION/TRAINING PROGRAM
Payer: COMMERCIAL

## 2022-02-04 ENCOUNTER — APPOINTMENT (OUTPATIENT)
Dept: RADIOLOGY | Facility: MEDICAL CENTER | Age: 62
DRG: 026 | End: 2022-02-04
Attending: STUDENT IN AN ORGANIZED HEALTH CARE EDUCATION/TRAINING PROGRAM
Payer: COMMERCIAL

## 2022-02-04 LAB
ANION GAP SERPL CALC-SCNC: 17 MMOL/L (ref 7–16)
BASOPHILS # BLD AUTO: 0.1 % (ref 0–1.8)
BASOPHILS # BLD: 0.01 K/UL (ref 0–0.12)
BUN SERPL-MCNC: 23 MG/DL (ref 8–22)
CALCIUM SERPL-MCNC: 8.7 MG/DL (ref 8.5–10.5)
CHLORIDE SERPL-SCNC: 104 MMOL/L (ref 96–112)
CO2 SERPL-SCNC: 22 MMOL/L (ref 20–33)
CREAT SERPL-MCNC: 0.94 MG/DL (ref 0.5–1.4)
EKG IMPRESSION: NORMAL
EKG IMPRESSION: NORMAL
EOSINOPHIL # BLD AUTO: 0 K/UL (ref 0–0.51)
EOSINOPHIL NFR BLD: 0 % (ref 0–6.9)
ERYTHROCYTE [DISTWIDTH] IN BLOOD BY AUTOMATED COUNT: 37.8 FL (ref 35.9–50)
GLUCOSE SERPL-MCNC: 150 MG/DL (ref 65–99)
HCT VFR BLD AUTO: 42.1 % (ref 42–52)
HCT VFR BLD AUTO: 45.4 % (ref 42–52)
HGB BLD-MCNC: 14.1 G/DL (ref 14–18)
HGB BLD-MCNC: 15.9 G/DL (ref 14–18)
IMM GRANULOCYTES # BLD AUTO: 0.05 K/UL (ref 0–0.11)
IMM GRANULOCYTES NFR BLD AUTO: 0.4 % (ref 0–0.9)
LYMPHOCYTES # BLD AUTO: 0.69 K/UL (ref 1–4.8)
LYMPHOCYTES NFR BLD: 5.6 % (ref 22–41)
MCH RBC QN AUTO: 29.9 PG (ref 27–33)
MCHC RBC AUTO-ENTMCNC: 35 G/DL (ref 33.7–35.3)
MCV RBC AUTO: 85.3 FL (ref 81.4–97.8)
MONOCYTES # BLD AUTO: 0.62 K/UL (ref 0–0.85)
MONOCYTES NFR BLD AUTO: 5.1 % (ref 0–13.4)
NEUTROPHILS # BLD AUTO: 10.87 K/UL (ref 1.82–7.42)
NEUTROPHILS NFR BLD: 88.8 % (ref 44–72)
NRBC # BLD AUTO: 0 K/UL
NRBC BLD-RTO: 0 /100 WBC
PLATELET # BLD AUTO: 192 K/UL (ref 164–446)
PMV BLD AUTO: 10.7 FL (ref 9–12.9)
POTASSIUM SERPL-SCNC: 3.9 MMOL/L (ref 3.6–5.5)
RBC # BLD AUTO: 5.32 M/UL (ref 4.7–6.1)
SODIUM SERPL-SCNC: 143 MMOL/L (ref 135–145)
TROPONIN T SERPL-MCNC: 18 NG/L (ref 6–19)
WBC # BLD AUTO: 12.2 K/UL (ref 4.8–10.8)

## 2022-02-04 PROCEDURE — A9270 NON-COVERED ITEM OR SERVICE: HCPCS | Performed by: STUDENT IN AN ORGANIZED HEALTH CARE EDUCATION/TRAINING PROGRAM

## 2022-02-04 PROCEDURE — 700101 HCHG RX REV CODE 250: Performed by: STUDENT IN AN ORGANIZED HEALTH CARE EDUCATION/TRAINING PROGRAM

## 2022-02-04 PROCEDURE — 85018 HEMOGLOBIN: CPT

## 2022-02-04 PROCEDURE — 700102 HCHG RX REV CODE 250 W/ 637 OVERRIDE(OP): Performed by: STUDENT IN AN ORGANIZED HEALTH CARE EDUCATION/TRAINING PROGRAM

## 2022-02-04 PROCEDURE — 93010 ELECTROCARDIOGRAM REPORT: CPT | Performed by: INTERNAL MEDICINE

## 2022-02-04 PROCEDURE — 93005 ELECTROCARDIOGRAM TRACING: CPT | Performed by: STUDENT IN AN ORGANIZED HEALTH CARE EDUCATION/TRAINING PROGRAM

## 2022-02-04 PROCEDURE — 700111 HCHG RX REV CODE 636 W/ 250 OVERRIDE (IP): Performed by: STUDENT IN AN ORGANIZED HEALTH CARE EDUCATION/TRAINING PROGRAM

## 2022-02-04 PROCEDURE — C9113 INJ PANTOPRAZOLE SODIUM, VIA: HCPCS | Performed by: STUDENT IN AN ORGANIZED HEALTH CARE EDUCATION/TRAINING PROGRAM

## 2022-02-04 PROCEDURE — 700101 HCHG RX REV CODE 250: Performed by: INTERNAL MEDICINE

## 2022-02-04 PROCEDURE — 700101 HCHG RX REV CODE 250: Performed by: NURSE PRACTITIONER

## 2022-02-04 PROCEDURE — 93010 ELECTROCARDIOGRAM REPORT: CPT | Mod: 76 | Performed by: INTERNAL MEDICINE

## 2022-02-04 PROCEDURE — 700105 HCHG RX REV CODE 258: Performed by: INTERNAL MEDICINE

## 2022-02-04 PROCEDURE — 97165 OT EVAL LOW COMPLEX 30 MIN: CPT

## 2022-02-04 PROCEDURE — 85025 COMPLETE CBC W/AUTO DIFF WBC: CPT

## 2022-02-04 PROCEDURE — A9270 NON-COVERED ITEM OR SERVICE: HCPCS | Performed by: INTERNAL MEDICINE

## 2022-02-04 PROCEDURE — 84484 ASSAY OF TROPONIN QUANT: CPT

## 2022-02-04 PROCEDURE — 700102 HCHG RX REV CODE 250 W/ 637 OVERRIDE(OP): Performed by: INTERNAL MEDICINE

## 2022-02-04 PROCEDURE — 80048 BASIC METABOLIC PNL TOTAL CA: CPT

## 2022-02-04 PROCEDURE — 70450 CT HEAD/BRAIN W/O DYE: CPT

## 2022-02-04 PROCEDURE — 700105 HCHG RX REV CODE 258: Performed by: STUDENT IN AN ORGANIZED HEALTH CARE EDUCATION/TRAINING PROGRAM

## 2022-02-04 PROCEDURE — 74018 RADEX ABDOMEN 1 VIEW: CPT

## 2022-02-04 PROCEDURE — 770022 HCHG ROOM/CARE - ICU (200)

## 2022-02-04 PROCEDURE — 97161 PT EVAL LOW COMPLEX 20 MIN: CPT

## 2022-02-04 PROCEDURE — 85014 HEMATOCRIT: CPT

## 2022-02-04 PROCEDURE — 99291 CRITICAL CARE FIRST HOUR: CPT | Mod: GC | Performed by: INTERNAL MEDICINE

## 2022-02-04 RX ORDER — CARVEDILOL 12.5 MG/1
6.25 TABLET ORAL 2 TIMES DAILY WITH MEALS
Status: DISCONTINUED | OUTPATIENT
Start: 2022-02-04 | End: 2022-02-04

## 2022-02-04 RX ORDER — POTASSIUM CHLORIDE 20 MEQ/1
40 TABLET, EXTENDED RELEASE ORAL ONCE
Status: COMPLETED | OUTPATIENT
Start: 2022-02-04 | End: 2022-02-04

## 2022-02-04 RX ORDER — LORAZEPAM 2 MG/ML
0.5 INJECTION INTRAMUSCULAR ONCE
Status: COMPLETED | OUTPATIENT
Start: 2022-02-04 | End: 2022-02-04

## 2022-02-04 RX ORDER — PANTOPRAZOLE SODIUM 40 MG/10ML
40 INJECTION, POWDER, LYOPHILIZED, FOR SOLUTION INTRAVENOUS 2 TIMES DAILY
Status: DISCONTINUED | OUTPATIENT
Start: 2022-02-04 | End: 2022-02-07

## 2022-02-04 RX ORDER — CARVEDILOL 12.5 MG/1
6.25 TABLET ORAL ONCE
Status: COMPLETED | OUTPATIENT
Start: 2022-02-04 | End: 2022-02-04

## 2022-02-04 RX ORDER — LIDOCAINE HYDROCHLORIDE 20 MG/ML
15 SOLUTION OROPHARYNGEAL EVERY 4 HOURS PRN
Status: DISCONTINUED | OUTPATIENT
Start: 2022-02-04 | End: 2022-02-07 | Stop reason: HOSPADM

## 2022-02-04 RX ORDER — SODIUM CHLORIDE 9 MG/ML
INJECTION, SOLUTION INTRAVENOUS CONTINUOUS
Status: DISCONTINUED | OUTPATIENT
Start: 2022-02-04 | End: 2022-02-06

## 2022-02-04 RX ORDER — ENEMA 19; 7 G/133ML; G/133ML
1 ENEMA RECTAL ONCE
Status: COMPLETED | OUTPATIENT
Start: 2022-02-04 | End: 2022-02-04

## 2022-02-04 RX ORDER — PROCHLORPERAZINE EDISYLATE 5 MG/ML
10 INJECTION INTRAMUSCULAR; INTRAVENOUS EVERY 6 HOURS PRN
Status: DISCONTINUED | OUTPATIENT
Start: 2022-02-04 | End: 2022-02-07 | Stop reason: HOSPADM

## 2022-02-04 RX ADMIN — NICARDIPINE HYDROCHLORIDE 12.5 MG/HR: 25 INJECTION, SOLUTION INTRAVENOUS at 02:40

## 2022-02-04 RX ADMIN — SODIUM CHLORIDE: 9 INJECTION, SOLUTION INTRAVENOUS at 04:17

## 2022-02-04 RX ADMIN — LORAZEPAM 0.5 MG: 2 INJECTION INTRAMUSCULAR; INTRAVENOUS at 20:34

## 2022-02-04 RX ADMIN — OXYCODONE HYDROCHLORIDE AND ACETAMINOPHEN 1 TABLET: 5; 325 TABLET ORAL at 04:16

## 2022-02-04 RX ADMIN — CARVEDILOL 6.25 MG: 12.5 TABLET, FILM COATED ORAL at 02:19

## 2022-02-04 RX ADMIN — NICARDIPINE HYDROCHLORIDE 10 MG/HR: 25 INJECTION, SOLUTION INTRAVENOUS at 09:00

## 2022-02-04 RX ADMIN — POLYETHYLENE GLYCOL 3350 1 PACKET: 17 POWDER, FOR SOLUTION ORAL at 13:06

## 2022-02-04 RX ADMIN — DOCUSATE SODIUM 100 MG: 100 CAPSULE, LIQUID FILLED ORAL at 05:16

## 2022-02-04 RX ADMIN — MAGNESIUM HYDROXIDE 30 ML: 400 SUSPENSION ORAL at 12:50

## 2022-02-04 RX ADMIN — DILTIAZEM HYDROCHLORIDE 30 MG: 30 TABLET, FILM COATED ORAL at 05:16

## 2022-02-04 RX ADMIN — ONDANSETRON 4 MG: 2 INJECTION INTRAMUSCULAR; INTRAVENOUS at 07:05

## 2022-02-04 RX ADMIN — PANTOPRAZOLE SODIUM 40 MG: 40 INJECTION, POWDER, FOR SOLUTION INTRAVENOUS at 19:52

## 2022-02-04 RX ADMIN — BISACODYL 10 MG: 10 SUPPOSITORY RECTAL at 17:27

## 2022-02-04 RX ADMIN — LEVETIRACETAM 750 MG: 500 TABLET, FILM COATED ORAL at 05:16

## 2022-02-04 RX ADMIN — LIDOCAINE HYDROCHLORIDE 15 ML: 20 SOLUTION ORAL; TOPICAL at 21:07

## 2022-02-04 RX ADMIN — OXYCODONE HYDROCHLORIDE AND ACETAMINOPHEN 1 TABLET: 5; 325 TABLET ORAL at 00:16

## 2022-02-04 RX ADMIN — SODIUM CHLORIDE: 9 INJECTION, SOLUTION INTRAVENOUS at 22:56

## 2022-02-04 RX ADMIN — ONDANSETRON 4 MG: 2 INJECTION INTRAMUSCULAR; INTRAVENOUS at 18:31

## 2022-02-04 RX ADMIN — CEFAZOLIN 2 G: 10 INJECTION, POWDER, FOR SOLUTION INTRAVENOUS at 05:17

## 2022-02-04 RX ADMIN — LISINOPRIL 20 MG: 20 TABLET ORAL at 05:17

## 2022-02-04 RX ADMIN — CEFAZOLIN 2 G: 10 INJECTION, POWDER, FOR SOLUTION INTRAVENOUS at 14:50

## 2022-02-04 RX ADMIN — LABETALOL HYDROCHLORIDE 10 MG: 5 INJECTION INTRAVENOUS at 20:55

## 2022-02-04 RX ADMIN — NICARDIPINE HYDROCHLORIDE 12.5 MG/HR: 25 INJECTION, SOLUTION INTRAVENOUS at 04:19

## 2022-02-04 RX ADMIN — SODIUM PHOSPHATE 133 ML: 7; 19 ENEMA RECTAL at 22:39

## 2022-02-04 RX ADMIN — NICARDIPINE HYDROCHLORIDE 12.5 MG/HR: 25 INJECTION, SOLUTION INTRAVENOUS at 06:47

## 2022-02-04 RX ADMIN — POTASSIUM CHLORIDE 40 MEQ: 1500 TABLET, EXTENDED RELEASE ORAL at 14:14

## 2022-02-04 RX ADMIN — NICARDIPINE HYDROCHLORIDE 10 MG/HR: 25 INJECTION, SOLUTION INTRAVENOUS at 00:35

## 2022-02-04 ASSESSMENT — ENCOUNTER SYMPTOMS
NAUSEA: 0
PALPITATIONS: 0
TINGLING: 0
NECK PAIN: 0
CHILLS: 0
PHOTOPHOBIA: 0
ABDOMINAL PAIN: 1
BRUISES/BLEEDS EASILY: 0
DOUBLE VISION: 0
TREMORS: 0
HEMOPTYSIS: 0
FEVER: 0
SPUTUM PRODUCTION: 0
BACK PAIN: 0

## 2022-02-04 ASSESSMENT — COGNITIVE AND FUNCTIONAL STATUS - GENERAL
SUGGESTED CMS G CODE MODIFIER MOBILITY: CH
SUGGESTED CMS G CODE MODIFIER DAILY ACTIVITY: CH
MOBILITY SCORE: 24
DAILY ACTIVITIY SCORE: 24

## 2022-02-04 ASSESSMENT — PAIN DESCRIPTION - PAIN TYPE
TYPE: ACUTE PAIN

## 2022-02-04 ASSESSMENT — GAIT ASSESSMENTS
DISTANCE (FEET): 300
GAIT LEVEL OF ASSIST: INDEPENDENT

## 2022-02-04 ASSESSMENT — ACTIVITIES OF DAILY LIVING (ADL): TOILETING: INDEPENDENT

## 2022-02-04 ASSESSMENT — LIFESTYLE VARIABLES: SUBSTANCE_ABUSE: 0

## 2022-02-04 NOTE — PROGRESS NOTES
Patient's BP is persistently elevated around , manual measurement no difference.  Nicardipine is maxed.  I will discontinue fluids, monitor closely, use labetalol prn if needed if HR is >60.

## 2022-02-04 NOTE — CARE PLAN
The patient is Watcher - Medium risk of patient condition declining or worsening    Shift Goals  Clinical Goals: Pain control, stable neuro exam    Progress made toward(s) clinical / shift goals: Educate patient regarding oral and intravenous pain medications. Patient with headache, pain is control with medications, rest and noise reduction. Neuro status has remained stable.       Problem: Pain - Standard  Goal: Alleviation of pain or a reduction in pain to the patient’s comfort goal  Outcome: Progressing    Problem: Neuro Status  Goal: Neuro status will remain stable or improve  Outcome: Progressing

## 2022-02-04 NOTE — CONSULTS
Critical Care Consultation    Date of consult: 2/3/2022    Referring Physician  Dilip Merino M.D.    Reason for Consultation  Critical care management of hypertensive urgency after craniotomy    History of Presenting Illness  Mr. Walker is a 61 year old male with the past medical history of hypertension, dyslipidemia, and mitral valve replacement who is off anticoagulation since December after he was thrown from a horse and suffered a right traumatic subdural hematoma. The patient was being followed by Dr. Merino from neurosurgery.  The patient has had intermittent left finger tingling as well as left facial numbness since the incident.  He continued to have headaches.  The patient denied any nausea, vomiting, extremity weakness, or visual changes.  The patient underwent elective evacuation of the right subdural hematoma today.  The case was uncomplicated and he was extubated. He had a subdural drain placed and comes to the ICU for continued monitoring of his blood pressure.    Code Status  Full Code    Review of Systems  Review of Systems   Constitutional: Negative for chills, fever and malaise/fatigue.   HENT: Negative for congestion, sore throat and tinnitus.    Eyes: Negative for blurred vision, double vision and photophobia.   Respiratory: Negative for cough and shortness of breath.    Cardiovascular: Negative for chest pain and leg swelling.   Gastrointestinal: Negative for abdominal pain, diarrhea, nausea and vomiting.   Genitourinary: Negative for flank pain and hematuria.   Musculoskeletal: Negative for back pain and neck pain.   Skin: Negative for rash.   Neurological: Positive for tingling, sensory change and headaches. Negative for speech change and focal weakness.   Endo/Heme/Allergies: Does not bruise/bleed easily.   Psychiatric/Behavioral: Negative for depression. The patient is not nervous/anxious.        Past Medical History   has a past medical history of Heart valve disease (2017), Hemorrhagic  disorder (HCC), High cholesterol, Hypertension, No known health problems, Primary pulmonary HTN (HCC), and Snoring.    Surgical History   has a past surgical history that includes coronart artery bypass, 2 (11/2017) and other.    Family History  family history is not on file.    Social History   reports that he has never smoked. He has quit using smokeless tobacco.  His smokeless tobacco use included chew. He reports previous alcohol use. He reports current drug use.    Medications  Home Medications     Reviewed by Elver Martin M.D. (Physician) on 02/03/22 at 1313  Med List Status: Complete   Medication Last Dose Status   acetaminophen (TYLENOL) 500 MG Tab 2/3/2022 Active   DILTIAZem (CARDIZEM) 30 MG Tab 2/3/2022 Active   levETIRAcetam (KEPPRA) 500 MG Tab 1/31/2022 Active   lisinopril (PRINIVIL) 20 MG Tab 2/2/2022 Active   Probiotic Product (PROBIOTIC PO) 2/2/2022 Active   simvastatin (ZOCOR) 5 MG Tab 2/2/2022 Active              Current Facility-Administered Medications   Medication Dose Route Frequency Provider Last Rate Last Admin   • DILTIAZem (CARDIZEM) tablet 30 mg  30 mg Oral BID Alejandra A Kaus, P.A.-C.   30 mg at 02/03/22 1745   • levETIRAcetam (KEPPRA) tablet 500 mg  500 mg Oral BID Alejandra A Kaus, P.A.-C.   500 mg at 02/03/22 1745   • lisinopril (PRINIVIL) tablet 20 mg  20 mg Oral DAILY Alejandra A Kaus, P.A.-C.   20 mg at 02/03/22 1745   • simvastatin (ZOCOR) tablet 20 mg  20 mg Oral Nightly Alejandra A Kaus, P.A.-C.       • Pharmacy Consult Request ...Pain Management Review 1 Each  1 Each Other PHARMACY TO DOSE Alejandra A Maico, P.A.-C.       • MD ALERT...DO NOT ADMINISTER NSAIDS or ASPIRIN unless ORDERED By Neurosurgery 1 Each  1 Each Other PRN Alejandra JAMILA Nina, P.A.-C.       • ondansetron (ZOFRAN) syringe/vial injection 4 mg  4 mg Intravenous Q4HRS PRN Alejandra A Kaus, P.A.-C.       • labetalol (NORMODYNE/TRANDATE) injection 10 mg  10 mg Intravenous Q HOUR PRN HILLARY Loyd-KIM   10 mg at 02/03/22 1744    • hydrALAZINE (APRESOLINE) injection 10 mg  10 mg Intravenous Q HOUR PRN Alejandra A Kaus, P.A.-C.   10 mg at 02/03/22 1735   • cloNIDine (CATAPRES) tablet 0.1 mg  0.1 mg Oral Q4HRS PRN Alejandra A Kaus, P.A.-C.       • docusate sodium (COLACE) capsule 100 mg  100 mg Oral BID Alejandra A Kaus, P.A.-C.   100 mg at 02/03/22 1745   • senna-docusate (PERICOLACE or SENOKOT S) 8.6-50 MG per tablet 1 Tablet  1 Tablet Oral Nightly Alejandra A Kaus, P.A.-C.       • senna-docusate (PERICOLACE or SENOKOT S) 8.6-50 MG per tablet 1 Tablet  1 Tablet Oral Q24HRS PRN Alejandra A Kaus, P.A.-C.       • polyethylene glycol/lytes (MIRALAX) PACKET 1 Packet  1 Packet Oral BID PRN Alejandra A Kaus, P.A.-C.       • magnesium hydroxide (MILK OF MAGNESIA) suspension 30 mL  30 mL Oral QDAY PRN Alejandra A Kaus, P.A.-C.       • bisacodyl (DULCOLAX) suppository 10 mg  10 mg Rectal Q24HRS PRN Alejandra A Kaus, P.A.-C.       • sodium phosphate (Fleet) enema 133 mL  1 Each Rectal Once PRN Alejandra A Kaus, P.A.-C.       • artificial tears (EYE LUBRICANT) ophth ointment 1 Application  1 Application Both Eyes Q8HRS Alejandra A Kaus, P.A.-C.       • NS infusion   Intravenous Continuous Alejandra A Darnellus, P.A.-C. 100 mL/hr at 02/03/22 1738 New Bag at 02/03/22 1738   • HYDROcodone-acetaminophen (NORCO) 5-325 MG per tablet 1 Tablet  1 Tablet Oral Q4HRS PRN Alejandra A Kaus, P.A.-C.       • HYDROcodone/acetaminophen (NORCO)  MG per tablet 1 Tablet  1 Tablet Oral Q4HRS PRN Alejandra A Kaus, P.A.-C.   1 Tablet at 02/03/22 1749   • oxyCODONE-acetaminophen (PERCOCET) 5-325 MG per tablet 1 Tablet  1 Tablet Oral Q4HRS PRN ERIN LoydARaya-C.       • HYDROmorphone (Dilaudid) injection 0.5 mg  0.5 mg Intravenous Q3HRS PRN HILLARY Loyd-C.       • ceFAZolin (Ancef) IV syringe 2 g  2 g Intravenous Q8HR BRENDA Loyd.A.-C.       • benzocaine-menthol (CEPACOL) lozenge 1 Lozenge  1 Lozenge Mouth/Throat Q2HRS PRN ERIN LoydA.-C.       • diphenhydrAMINE (BENADRYL)  tablet/capsule 25 mg  25 mg Oral Q6HRS PRN Alejandra A Darnellus, P.A.-C.        Or   • diphenhydrAMINE (BENADRYL) injection 25 mg  25 mg Intravenous Q6HRS PRN Alejandra A Kaus, P.A.-C.       • niCARdipine (CARDENE) 25 mg in  mL Infusion  0-15 mg/hr Intravenous Continuous Sis Hercules M.D. 50 mL/hr at 02/03/22 1746 5 mg/hr at 02/03/22 1746       Allergies  No Known Allergies    Vital Signs last 24 hours  Temp:  [36.3 °C (97.4 °F)-36.6 °C (97.9 °F)] 36.4 °C (97.6 °F)  Pulse:  [74-84] 74  Resp:  [15-30] 18  BP: (130-248)/() 169/80  SpO2:  [93 %-96 %] 94 %    Physical Exam  Physical Exam  Vitals and nursing note reviewed.   Constitutional:       Appearance: He is obese. He is ill-appearing. He is not toxic-appearing.   HENT:      Head: Normocephalic and atraumatic.      Right Ear: External ear normal.      Left Ear: External ear normal.      Nose: Nose normal. No rhinorrhea.      Mouth/Throat:      Mouth: Mucous membranes are dry.      Pharynx: Oropharynx is clear. No oropharyngeal exudate.   Eyes:      General: No scleral icterus.     Extraocular Movements: Extraocular movements intact.      Conjunctiva/sclera: Conjunctivae normal.      Pupils: Pupils are equal, round, and reactive to light.   Cardiovascular:      Rate and Rhythm: Normal rate and regular rhythm.      Pulses: Normal pulses.      Heart sounds: Normal heart sounds. No murmur heard.      Pulmonary:      Effort: No respiratory distress.      Breath sounds: Normal breath sounds. No wheezing.   Chest:      Chest wall: No tenderness.   Abdominal:      General: Bowel sounds are normal. There is no distension.      Palpations: Abdomen is soft.      Tenderness: There is no abdominal tenderness. There is no guarding or rebound.   Musculoskeletal:         General: Normal range of motion.      Cervical back: Normal range of motion and neck supple.      Right lower leg: No edema.      Left lower leg: No edema.   Lymphadenopathy:      Cervical: No cervical  adenopathy.   Skin:     General: Skin is warm and dry.      Capillary Refill: Capillary refill takes less than 2 seconds.      Findings: No rash.   Neurological:      Mental Status: He is alert and oriented to person, place, and time.      Cranial Nerves: No cranial nerve deficit.      Sensory: No sensory deficit.      Motor: No weakness.   Psychiatric:         Mood and Affect: Mood normal.         Behavior: Behavior normal.         Thought Content: Thought content normal.         Fluids    Intake/Output Summary (Last 24 hours) at 2/3/2022 1818  Last data filed at 2/3/2022 1610  Gross per 24 hour   Intake 900 ml   Output 135 ml   Net 765 ml       Laboratory  Recent Results (from the past 48 hour(s))   URINALYSIS,CULTURE IF INDICATED    Collection Time: 02/02/22 11:51 AM    Specimen: Urine   Result Value Ref Range    Color Yellow     Character Clear     Specific Gravity 1.024 <1.035    Ph 5.5 5.0 - 8.0    Glucose Negative Negative mg/dL    Ketones Negative Negative mg/dL    Protein Negative Negative mg/dL    Bilirubin Negative Negative    Urobilinogen, Urine 0.2 Negative    Nitrite Negative Negative    Leukocyte Esterase Negative Negative    Occult Blood Negative Negative    Micro Urine Req see below    SARS-COV RAPID Antigen TRENT: Collect dry nasal swab    Collection Time: 02/02/22 11:51 AM   Result Value Ref Range    SARS-CoV-2 Source Nasal Swab     SARS-COV ANTIGEN TRENT NotDetected NotDetected   CBC WITH DIFFERENTIAL    Collection Time: 02/02/22 11:52 AM   Result Value Ref Range    WBC 7.2 4.8 - 10.8 K/uL    RBC 5.26 4.70 - 6.10 M/uL    Hemoglobin 15.6 14.0 - 18.0 g/dL    Hematocrit 45.7 42.0 - 52.0 %    MCV 86.9 81.4 - 97.8 fL    MCH 29.7 27.0 - 33.0 pg    MCHC 34.1 33.7 - 35.3 g/dL    RDW 38.7 35.9 - 50.0 fL    Platelet Count 167 164 - 446 K/uL    MPV 11.1 9.0 - 12.9 fL    Neutrophils-Polys 75.90 (H) 44.00 - 72.00 %    Lymphocytes 12.70 (L) 22.00 - 41.00 %    Monocytes 9.40 0.00 - 13.40 %    Eosinophils 1.10 0.00  - 6.90 %    Basophils 0.60 0.00 - 1.80 %    Immature Granulocytes 0.30 0.00 - 0.90 %    Nucleated RBC 0.00 /100 WBC    Neutrophils (Absolute) 5.48 1.82 - 7.42 K/uL    Lymphs (Absolute) 0.92 (L) 1.00 - 4.80 K/uL    Monos (Absolute) 0.68 0.00 - 0.85 K/uL    Eos (Absolute) 0.08 0.00 - 0.51 K/uL    Baso (Absolute) 0.04 0.00 - 0.12 K/uL    Immature Granulocytes (abs) 0.02 0.00 - 0.11 K/uL    NRBC (Absolute) 0.00 K/uL   Basic Metabolic Panel    Collection Time: 22 11:52 AM   Result Value Ref Range    Sodium 143 135 - 145 mmol/L    Potassium 4.3 3.6 - 5.5 mmol/L    Chloride 106 96 - 112 mmol/L    Co2 24 20 - 33 mmol/L    Glucose 99 65 - 99 mg/dL    Bun 24 (H) 8 - 22 mg/dL    Creatinine 0.91 0.50 - 1.40 mg/dL    Calcium 9.3 8.5 - 10.5 mg/dL    Anion Gap 13.0 7.0 - 16.0   PT    Collection Time: 22 11:52 AM   Result Value Ref Range    PT 12.7 12.0 - 14.6 sec    INR 0.98 0.87 - 1.13   APTT    Collection Time: 22 11:52 AM   Result Value Ref Range    APTT 30.1 24.7 - 36.0 sec   ESTIMATED GFR    Collection Time: 22 11:52 AM   Result Value Ref Range    GFR If African American >60 >60 mL/min/1.73 m 2    GFR If Non African American >60 >60 mL/min/1.73 m 2   EKG    Collection Time: 22 11:52 AM   Result Value Ref Range    Report       [facility]    Test Date:  2022  Pt Name:    BARRY GUERRERO                   [facility]  MRN:        [id number]                      Room:  Gender:     Male                         Technician: CARLOS  :        1960                   Requested By:WILLAM JIMÉNEZ  Order #:    [id number]                    Reading MD: Colin Hicks MD    Measurements  Intervals                                Axis  Rate:       59                           P:          66  UT:         220                          QRS:        48  QRSD:       102                          T:          69  QT:         488  QTc:        484    Interpretive Statements  SINUS BRADYCARDIA  FIRST  DEGREE AV BLOCK  BORDERLINE INFERIOR Q WAVES  MINIMAL ST DEPRESSION, LATERAL LEADS  BORDERLINE PROLONGED QT INTERVAL  No previous ECG available for comparison  Electronically Signed On 2-2-2022 16:02:25 PST by Colin Hicks MD         Imaging  Reviewed    Assessment/Plan  * Subdural hematoma (HCC)- (present on admission)  Assessment & Plan  S/p crani with evacuation and subdural drain placed  Neurosurgery following  Neuro checks per protocol  SBP goals < 160  Keppra for prophylaxis  Cefazolin       Hypertensive urgency- (present on admission)  Assessment & Plan  SBP goals < 160  Nicardipine infusion for SBP goals with active titration  Labetalol/hydralazine as needed  Will resume patient's home meds    H/O mitral valve repair- (present on admission)  Assessment & Plan  Holding warfarin      Discussed patient condition and risk of morbidity and/or mortality with RN, RT, Pharmacy, Charge nurse / hot rounds, Patient and neurosurgery.    The patient remains critically ill.  Critical care time = 55 minutes in directly providing and coordinating critical care and extensive data review.  No time overlap and excludes procedures.

## 2022-02-04 NOTE — PROGRESS NOTES
Pt arrived from PACU s/p SDH evacuation with chavo hole now with subdural drain placed. Pt in flat position, subdural drain below head as per neurosurgeon. Pt AOx4 MARTINEZ, pupils equal and reactive.denies chest pain or shortness of breath.BP goal <160, Nicardipine drip started @5mg=50ml/hr, right FA PIV patent.   Completed CHG bath, skin assessed, intact.

## 2022-02-04 NOTE — PROGRESS NOTES
Dr Merino at bedside, removed subdural drain. Ok to remove betts and ambulate as tolerated.   Removed betts catheter, kamila well. Provided urinal. Assisted pt out of bed to chair, steadu gait noted.

## 2022-02-04 NOTE — OR NURSING
1613: Pt arrives to PACU asleep and calm. VSS. Drain in place below HOB, pt on strict bed rest.     1350: Alejandra NUÑEZ at beside. Drain assessed by her. Pt resting comfortably.     1610: Report called to Ladonna WAGNER. Pts wife called no answer.    1650: SBP now in the 160's after multiple IV PRN meds. Ok for pt to go to ICU at this time per Dr. Martin.  Pt going to ICU with this RN and CNA on monitor.

## 2022-02-04 NOTE — THERAPY
"Occupational Therapy   Initial Evaluation     Patient Name: Will Walker  Age:  61 y.o., Sex:  male  Medical Record #: [id number]  Today's Date: 2/4/2022     Precautions: Fall Risk  Comments: s/p chavo holes, SBP <160    Assessment  Patient is 61 y.o. male admitted for surgical intervention of SDH. Pt had a fall from horse in Nov, dx w/R sided SHD, noticed worsening h/a and pronator drift. Found to have R sided mixed density SDH w/mass effect and midline shift. Pt is now s/p chavo holes, remains w/mild fine motor impairment to L hand, but able to complete functional ADL's w/o assist. Anticipate on going recovery, educated on TBI recovery.     Plan  Recommend Occupational Therapy for Evaluation only     DC Equipment Recommendations: None  Discharge Recommendations: Anticipate that the patient will have no further occupational therapy needs after discharge from the hospital     Subjective  \" I will definitively wear a helmet from now on\"      Objective     02/04/22 1441   Total Time Spent   Total Time Spent (Mins) 19   Charge Group   OT Evaluation OT Evaluation Low   Initial Contact Note    Initial Contact Note Order Received and Verified, Evaluation Only - Patient Does Not Require Further Acute Occupational Therapy at this Time.  However, May Benefit from Post Acute Therapy for Higher Level Functional Deficits.   Prior Living Situation   Prior Services Home-Independent   Housing / Facility 1 Story House   Steps Into Home 0   Steps In Home 0   Bathroom Set up Walk In Shower   Equipment Owned None   Lives with - Patient's Self Care Capacity Spouse   Comments wife can provide assist as needed    Prior Level of ADL Function   Self Feeding Independent   Grooming / Hygiene Independent   Bathing Independent   Dressing Independent   Toileting Independent   Prior Level of IADL Function   Medication Management Independent   Laundry Independent   Kitchen Mobility Independent   Finances Independent   Home Management Independent "   Shopping Independent   Prior Level Of Mobility Independent Without Device in Community   Driving / Transportation Driving Independent   Occupation (Pre-Hospital Vocational) Employed Full Time  (VET)   History of Falls   History of Falls No   Precautions   Precautions Fall Risk   Comments s/p chavo holes, SBP <160   Pain 0 - 10 Group   Location Abdomen   Location Orientation Anterior;Upper   Therapist Pain Assessment During Activity;Nurse Notified;0   Cognition    Cognition / Consciousness WDL   Level of Consciousness Alert   Passive ROM Upper Body   Passive ROM Upper Body WDL   Active ROM Upper Body   Active ROM Upper Body  WDL   Strength Upper Body   Upper Body Strength  WDL   Sensation Upper Body   Upper Extremity Sensation  Not Tested   Upper Body Muscle Tone   Upper Body Muscle Tone  WDL   Neurological Concerns   Neurological Concerns No   Coordination Upper Body   Coordination X   Fine Motor Coordination impaired L hand finger to thumb opposition primarily ulnar side    Balance Assessment   Sitting Balance (Static) Good   Sitting Balance (Dynamic) Good   Standing Balance (Static) Good   Standing Balance (Dynamic) Good   Weight Shift Sitting Good   Weight Shift Standing Good   Bed Mobility    Supine to Sit Independent   Sit to Supine Independent   Scooting Independent   Rolling Independent   ADL Assessment   Grooming Supervision;Standing   Upper Body Dressing Supervision   Lower Body Dressing Supervision   Toileting Supervision   How much help from another person does the patient currently need...   6 Clicks Daily Activity Score 24   Functional Mobility   Sit to Stand Independent   Bed, Chair, Wheelchair Transfer Independent   Toilet Transfers Supervised   Mobility walking in room no AD no LOB    Activity Tolerance   Comments no overt c/o pain or fatigue    Education Group   Education Provided Traumatic Brain Injury   Role of Occupational Therapist Patient Response Patient;Acceptance;Explanation   TBI Patient  Response Patient;Acceptance;Explanation   Problem List   Problem List None   Anticipated Discharge Equipment and Recommendations   DC Equipment Recommendations None   Discharge Recommendations Anticipate that the patient will have no further occupational therapy needs after discharge from the hospital   Interdisciplinary Plan of Care Collaboration   IDT Collaboration with  Nursing   Patient Position at End of Therapy In Bed;Call Light within Reach;Tray Table within Reach;Phone within Reach   Collaboration Comments RN aware of OT eval and pts efforts    Session Information   Date / Session Number  2/4 #1 eval only    Priority 0

## 2022-02-04 NOTE — THERAPY
"Physical Therapy   Initial Evaluation     Patient Name: Will Walker  Age:  61 y.o., Sex:  male  Medical Record #: [id number]  Today's Date: 2/4/2022     Precautions  Precautions: (P) Fall Risk  Comments: (P) s/p crani, SBP <160    Assessment  Patient is 61 y.o. male with hx of CABG presents with SDH s/p fall from horse back in November. Patient is indep at baseline and today demos safe functional mobility without AD.  He is close to his baseline function but is limited by abdominal pain 2/2 ileus.  Patient has a supportive wife at home and demos no further acute care PT needs at this time.     Plan    DC Equipment Recommendations:  None  Discharge Recommendations:  Anticipate that the patient will have no further physical therapy needs after discharge from the hospital       Subjective    \"I'm just constipated\"     Objective       02/04/22 1400   Precautions   Precautions Fall Risk   Comments s/p crani, SBP <160   Pain 0 - 10 Group   Location Abdomen   Therapist Pain Assessment Post Activity Pain Same as Prior to Activity  (bloating and constipation )   Prior Living Situation   Prior Services Home-Independent   Housing / Facility 1 Story House   Steps Into Home 0   Steps In Home 0   Bathroom Set up Walk In Shower   Equipment Owned None   Lives with - Patient's Self Care Capacity Spouse   Comments wife can provide assist as needed    Prior Level of Functional Mobility   Bed Mobility Independent   Transfer Status Independent   Ambulation Independent   Distance Ambulation (Feet)   (community distances )   Assistive Devices Used None   Comments patient is a vet   History of Falls   History of Falls No   Cognition    Cognition / Consciousness WDL   Level of Consciousness Alert   Comments pleasant and cooperative    Passive ROM Lower Body   Passive ROM Lower Body WDL   Active ROM Lower Body    Active ROM Lower Body  WDL   Strength Lower Body   Lower Body Strength  WDL   Sensation Lower Body   Lower Extremity Sensation   " WDL   Lower Body Muscle Tone   Lower Body Muscle Tone  WDL   Strength Upper Body   Upper Body Strength  WDL   Coordination Upper Body   Coordination X   Fine Motor Coordination impaired finger opposition    Coordination Lower Body    Coordination Lower Body  WDL   Vision   Vision Comments WFL   Balance Assessment   Sitting Balance (Static) Good   Sitting Balance (Dynamic) Good   Standing Balance (Static) Good   Standing Balance (Dynamic) Good   Weight Shift Sitting Good   Weight Shift Standing Good   Gait Analysis   Gait Level Of Assist Independent   Assistive Device None   Distance (Feet) 300   # of Times Distance was Traveled 1   Bed Mobility    Supine to Sit Independent   Sit to Supine Independent   Scooting Independent   Rolling Independent   Functional Mobility   Sit to Stand Independent   Bed, Chair, Wheelchair Transfer Independent   Toilet Transfers Supervised   How much difficulty does the patient currently have...   Turning over in bed (including adjusting bedclothes, sheets and blankets)? 4   Sitting down on and standing up from a chair with arms (e.g., wheelchair, bedside commode, etc.) 4   Moving from lying on back to sitting on the side of the bed? 4   How much help from another person does the patient currently need...   Moving to and from a bed to a chair (including a wheelchair)? 4   Need to walk in a hospital room? 4   Climbing 3-5 steps with a railing? 4   6 clicks Mobility Score 24   Activity Tolerance   Sitting in Chair pre-post session    Sitting Edge of Bed NT   Standing 15 min    Edema / Skin Assessment   Comments crani staples intact    Education Group   Education Provided Role of Physical Therapist;Gait Training   Role of Physical Therapist Patient Response Patient;Acceptance;Explanation;Demonstration;Verbal Demonstration;Action Demonstration   Gait Training Patient Response Family;Acceptance;Explanation;Demonstration;Verbal Demonstration;Action Demonstration   Anticipated Discharge  Equipment and Recommendations   DC Equipment Recommendations None   Discharge Recommendations Anticipate that the patient will have no further physical therapy needs after discharge from the hospital       Faviola Anne PT, DPT, GCS

## 2022-02-04 NOTE — OP REPORT
DATE OF SERVICE:  02/03/2022     PREOPERATIVE DIAGNOSIS:  Right-sided mixed density subdural hematoma causing   mass effect and midline shift.     POSTOPERATIVE DIAGNOSIS:  Right-sided mixed density subdural hematoma causing   mass effect and midline shift.     PROCEDURES PERFORMED:  1.  Right-sided craniotomy for evacuation of subdural hematoma.  2.  Subdural drain.     SURGEON:  Dilip Merino MD     ASSISTANT:  JOAQUIN Michelle.     ANESTHESIA:  General.     ESTIMATED BLOOD LOSS:  25 mL     COMPLICATIONS:  None.     FINDINGS:  Right-sided subdural hematoma with septations.     INDICATIONS FOR PROCEDURE:  The patient is a 61-year-old male, previously on   Coumadin, who fell around [facility].  He presented to the [facility]   on 12/30 with headaches and was found to have a chronic right sided subdural   hematoma.  At that time, the patient was asymptomatic besides mild headache,   so I offered him conservative therapy versus surgery.  He chose conservative   therapy and the subsequent month, the patient developed a little bit more   symptoms, worsening headaches and a new pronator drift.  For this reason, I   urged the patient to consider surgery.  Risks and benefits were discussed with   the patient and his wife, which included bleeding, infection, need for   additional surgery, seizures, motor weakness, sensory changes as well as   associated general anesthesia, which includes myocardial infarction, stroke,   inability to extubate and in rare instance death.  Despite these risks, the   patient and his wife wished to proceed with surgery.     DESCRIPTION OF PROCEDURE:  Informed consent was obtained by the patient.  The   patient was brought to the operating room and induced with general anesthetic.    The patient's head was placed on a Rowley headholder and an incision was   measured approximately 6 cm right of midline through right over the subdural   hematoma.  The patient was prepped and draped in  sterile fashion.  Timeout   occurred and preoperative antibiotics were given.  A 3 cm incision was made   and dura chavo hole was drilled.  I incised the dura and there was a small   release of fluid.  Unfortunately, there was a thick membrane at that area that   I was able to bipolar; however, I was not able to get satisfactory relief.    Because of this, I decided to open up the craniotomy.  I extended the incision   posteriorly another 2 cm and using a foot plate, made a small craniotomy   approximately 4 cm.  This allowed me to open up the dura completely.  At the   craniotomy site, there was a large septation here, I was able to bipolar and   taken down and got a great release of fluid.  Using a brain spatula, I was   able to irrigate both the frontal area as well as the parietal area.    Satisfied that I had good decompression of the subdural hematoma, I placed an   EVD drain into the subdural space, the depth of about 11 cm.  I tunneled this   through the scalp and there was drainage at the back of it of motor oil like   fluid.  I irrigated the wound and over the dural defect placed DuraMatrix   Onlay Plus and then reassembled the bone flap with the EVD catheter coming out   the chavo hole.  I used titanium plates to secure the bone flap to the   remaining skull, irrigated the wound again, closed the wound with Vicryl   sutures and the skin chromic.  I secured the EVD catheter to the scalp with   another stitch and hooked it up to an EVD collection bag.  I turned over the   patient to general anesthesia for extubation.  Sponge and needle counts were   correct x2.  No complications were noted.  The patient will be admitted to the   ICU overnight for observation for the subdural drain.        ______________________________  MD ASIM Del Angel/LINDA    DD:  02/04/2022 11:12  DT:  02/04/2022 11:39    Job#:  [id number]

## 2022-02-04 NOTE — ASSESSMENT & PLAN NOTE
SBP goals < 160  Nicardipine infusion for SBP goals with active titration  Labetalol/hydralazine as needed  Will resume patient's home meds

## 2022-02-04 NOTE — ASSESSMENT & PLAN NOTE
S/p crani with evacuation and subdural drain placed  Neurosurgery following  Neuro checks per protocol  SBP goals < 160  Keppra for prophylaxis  Cefazolin

## 2022-02-04 NOTE — PROGRESS NOTES
Asked to the review the patient's ECGs. Briefly, he has a history of MVR and CABG and yesterday underwent evacuation of a SDH with midline shift. This evening he developed substernal chest pressure. His initial ECG showed some ST depressions, but no STEMI criteria. A repeat ECG was possibly concerning for new ST elevations. I reviewed this ECG, which had significant baseline artifact making interpretation challenging. However, there did not appear to be clear ST changes diagnostic of STEMI. Regardless, the patient is most likely not a candidate for cardiac catheterization with possible PCI, which may require high dose heparin and DAPT. He is already on a statin and nicardipine drip.    I recommended repeating an ECG to obtain one with less artifact and starting beta-blocker therapy. If a repeat ECG showed clear ST elevations, then we could consider cardiac catheterization if this was not thought to be excessively high risk per the [facility] service. However, if cath and possible PCI was likely to pose a risk of catastrophic brain injury, unfortunately, the only option would be to medically manage as best as possible. Would also recommend continuing to trend troponins, if his troponin does not increase significantly, this would be more reassuring that his ECG may be related to either his intracranial process or stress on underlying stable coronary lesions and not acute plaque rupture.

## 2022-02-04 NOTE — THERAPY
Missed Therapy     Patient Name: Will Walker  Age:  61 y.o., Sex:  male  Medical Record #: [id number]  Today's Date: 2/4/2022    Discussed missed therapy with RN        02/04/22 5736   Initial Contact Note    Initial Contact Note Order Received and Verified, Occupational Therapy Evaluation in Progress with Full Report to Follow.   Interdisciplinary Plan of Care Collaboration   Collaboration Comments OT eval held currently on bedrest will follow up once cleared    Session Information   Date / Session Number  2/4 HOLD bedrest    Priority   (needs eval )

## 2022-02-04 NOTE — PROGRESS NOTES
Neurosurgery Progress Note    Subjective:  Pt doing well today. Headaches improved. Pt attributes overnight issues on ileus. Feels better after vomiting.    Exam:  AOx3  PERRL, EOMI  F=, TML  MAEW, no drift  SILT  C/d/i    BP  Min: 120/56  Max: 248/116  Pulse  Av.5  Min: 71  Max: 99  Resp  Av.3  Min: 0  Max: 41  Temp  Av.6 °C (97.9 °F)  Min: 36.1 °C (97 °F)  Max: 37 °C (98.6 °F)  SpO2  Av.4 %  Min: 87 %  Max: 96 %    No data recorded    Recent Labs     22  1152 22  0344   WBC 7.2 12.2*   RBC 5.26 5.32   HEMOGLOBIN 15.6 15.9   HEMATOCRIT 45.7 45.4   MCV 86.9 85.3   MCH 29.7 29.9   MCHC 34.1 35.0   RDW 38.7 37.8   PLATELETCT 167 192   MPV 11.1 10.7     Recent Labs     22  1152 22  0344   SODIUM 143 143   POTASSIUM 4.3 3.9   CHLORIDE 106 104   CO2 24 22   GLUCOSE 99 150*   BUN 24* 23*   CREATININE 0.91 0.94   CALCIUM 9.3 8.7     Recent Labs     22  115   APTT 30.1   INR 0.98           Intake/Output                       22 0700 - 22 0659 22 07 - 22 0659     1900-0659 Total 5770-7815 2090-7625 Total                 Intake    P.O.  --  540 540  --  -- --    P.O. -- 540 540 -- -- --    I.V.  1000  1749.2 2749.2  250  -- 250    Cardene Volume -- 1474.2 1474.2 250 -- 250    Volume (mL) (Lactated Ringers) 900 -- 900 -- -- --    Volume (mL) (NS infusion) 100 275 375 -- -- --    Other  --  120 120  --  -- --    Medications (PO/Enteral Liquids) -- 120 120 -- -- --    IV Piggyback  --  512 512  --  -- --    Volume (mL) (ceFAZolin (Ancef) IV syringe 2 g) -- 512 512 -- -- --    Total Intake 1000 2921.2 3921.2 250 -- 250       Output    Urine  850  560 1410  200  -- 200    Output (mL) (Urethral Catheter Non-latex 16 Fr.)  200 -- 200    Emesis  --  -- --  --  -- --    Emesis - Number of Times -- 1 x 1 x -- -- --    Drains  22  30 52  --  -- --    Output (mL) (ICP/Ventriculostomy Right Temporal region) 22 30 52 -- -- --    Blood  25  --  25  --  -- --    Est. Blood Loss 25 -- 25 -- -- --    Total Output  200 -- 200       Net I/O     103 2331.2 2434.2 50 -- 50            Intake/Output Summary (Last 24 hours) at 2/4/2022 1104  Last data filed at 2/4/2022 0800  Gross per 24 hour   Intake 4171.18 ml   Output 1687 ml   Net 2484.18 ml            • NS   Continuous   • potassium chloride SA  40 mEq Once   • artificial tears  1 Application Q8HRS PRN   • DILTIAZem  30 mg BID   • lisinopril  20 mg DAILY   • simvastatin  20 mg Nightly   • Pharmacy Consult Request  1 Each PHARMACY TO DOSE   • MD ALERT...DO NOT ADMINISTER NSAIDS or ASPIRIN unless ORDERED By Neurosurgery  1 Each PRN   • ondansetron  4 mg Q4HRS PRN   • cloNIDine  0.1 mg Q4HRS PRN   • docusate sodium  100 mg BID   • senna-docusate  1 Tablet Nightly   • senna-docusate  1 Tablet Q24HRS PRN   • polyethylene glycol/lytes  1 Packet BID PRN   • magnesium hydroxide  30 mL QDAY PRN   • bisacodyl  10 mg Q24HRS PRN   • sodium phosphate  1 Each Once PRN   • HYDROcodone-acetaminophen  1 Tablet Q4HRS PRN   • HYDROcodone/acetaminophen  1 Tablet Q4HRS PRN   • oxyCODONE-acetaminophen  1 Tablet Q4HRS PRN   • HYDROmorphone  0.5 mg Q3HRS PRN   • ceFAZolin  2 g Q8HR   • benzocaine-menthol  1 Lozenge Q2HRS PRN   • diphenhydrAMINE  25 mg Q6HRS PRN    Or   • diphenhydrAMINE  25 mg Q6HRS PRN   • niCARdipine infusion  0-15 mg/hr Continuous   • hydrALAZINE  20 mg Q HOUR PRN   • labetalol  10-20 mg Q HOUR PRN   • levETIRAcetam  750 mg BID       Assessment and Plan:  Hospital day #2  POD #1 for R craniotomy and subdural drain for chronic SDH  Prophylactic anticoagulation: yes         Start date/time: today  Drain removed at bedside by myself  Ambulate   keppra 500 BID for 7 days  OK to discharge if blood pressure under control

## 2022-02-04 NOTE — PROGRESS NOTES
Patient has some chest tightness, states it is not chest pain.  EKG significant for V3, V4, V5 ST depressions.  Patient admitted for subdural hematoma, this condition certainly can cause ST depressions.  Will send troponin to hope for ruling out any NSTEMI  In the case of potential NSTEMI, I can't provide any aspirin or anticoagulation due to ongoing subdural hematoma.  I will repeat EKG.    1:30 am: Repeat EKG showing potential ST elevation. I discussed the new EKG with Dr. Patricio Reyes from Cardiology. Stated it is likely artifact, and if STEMI, due to recent chavo hole drainage, he is not a candidate for any catheterization. Recommended 6.25 coreg for hypertension with a benefit of decreasing cardiac demand. I will place this medication.    1:40 am, troponin came back 18.

## 2022-02-04 NOTE — PROGRESS NOTES
[facility] ICU Progress Note            Admit Date: 2/3/2022    Resident(s): Michaela Chang M.D.   Attending:  LUZ ZELAYA/ Dr. Chang    Patient ID:    Name:  Will Walker   YOB: 1960  Age:  61 y.o.  male   MRN:  [id number]    Reason for interval visit  (Principal Problem)   Subdural hematoma (HCC) .    Hospital Course (carried forward and updated):  Mr. Walker is a 61 year old male with the past medical history of hypertension, dyslipidemia, and mitral valve replacement who is off anticoagulation since December after he was thrown from a horse and suffered a right traumatic subdural hematoma. The patient was being followed by Dr. Merino from neurosurgery.  The patient has had intermittent left finger tingling as well as left facial numbness since the incident.  He continued to have headaches.  The patient denied any nausea, vomiting, extremity weakness, or visual changes.  The patient underwent elective evacuation of the right subdural hematoma 2/3.  The case was uncomplicated and he was extubated. He had a subdural drain placed and came to the ICU for continued monitoring of his blood pressure.    Interval Events:  -overnight chest discomfort, ekg done, was run by cardiology and determine to be an artifact; trops unremarkable  -vomited early a.m., said no palpable abdominal pain nor nausea  -neurosx pulled drain  -still on drip  -coreg stopped and switched to home dilt    Consultants:  Critical Care  Neurosx       Review of Systems   Constitutional: Negative for chills and fever.   HENT: Negative for ear pain and tinnitus.    Eyes: Negative for double vision and photophobia.   Respiratory: Negative for hemoptysis and sputum production.    Cardiovascular: Negative for chest pain and palpitations.   Gastrointestinal: Positive for abdominal pain. Negative for nausea.   Genitourinary: Negative for frequency and urgency.   Musculoskeletal: Negative for back pain and neck pain.   Skin: Negative for itching  and rash.   Neurological: Negative for tingling and tremors.   Endo/Heme/Allergies: Negative for environmental allergies. Does not bruise/bleed easily.   Psychiatric/Behavioral: Negative for substance abuse and suicidal ideas.        Physical Exam  Constitutional:       General: He is not in acute distress.     Appearance: He is not diaphoretic.   HENT:      Head: Normocephalic and atraumatic.   Eyes:      Conjunctiva/sclera: Conjunctivae normal.   Neck:      Thyroid: No thyromegaly.      Trachea: No tracheal deviation.   Cardiovascular:      Rate and Rhythm: Regular rhythm.      Heart sounds: Normal heart sounds. No murmur heard.  No friction rub.   Pulmonary:      Breath sounds: No wheezing or rales.   Abdominal:      General: There is distension.      Tenderness: There is no abdominal tenderness.   Genitourinary:     Penis: No discharge.       Rectum: Guaiac result negative.   Musculoskeletal:         General: No tenderness or deformity.      Cervical back: Normal range of motion and neck supple.   Skin:     Findings: No erythema or rash.   Neurological:      Cranial Nerves: No cranial nerve deficit.      Coordination: Coordination normal.      Comments: Drain pulled, sensory and motor function fully intact          Vitals:    02/04/22 0900 02/04/22 0915 02/04/22 0930 02/04/22 0945   BP: 123/56 120/56 130/60 133/61   Pulse: 79 81 80 78   Resp: (!) 25 (!) 24 (!) 26 (!) 27   Temp:       TempSrc:       SpO2: 94% 93% 93% 94%   Weight:       Height:            Assessment and Plan    Subdural hematoma (HCC)- (present on admission)  Assessment & Plan  S/p crani with evacuation and subdural drain placed  Neurosurgery following, pulled drain  Neuro checks per protocol  SBP goals < 160  Keppra for prophylaxis  Cefazolin      Hypertensive urgency- (present on admission)  Assessment & Plan  SBP goals < 160  Nicardipine infusion for SBP goals with active titration  Labetalol/hydralazine as needed  Will resume patient's home  meds    H/O mitral valve repair- (present on admission)  Assessment & Plan  Holding warfarin

## 2022-02-04 NOTE — PROGRESS NOTES
"0900: Patient with change in neuro status, uncoordinated movements of fingers in the right hand that did not present prior, patient could not touch his thumb to his pinky, describe numbness. L arm tremor with movement. Denies any other symptoms. Notified neurosurgery, JOAQUIN Nina, no new order given, order to report any increase or new symptoms.     0950: JOAQUIN Nina ordered 1000mg of Keppra IV stat, and changed oral Keppra dose to 750 mg twice a day. Telephone order with read back.     1040: Patient complained of new chest tightness, \"feel like pressure on my chest\". Notified MD Ann, stat EKG ordered.    0350: Patient with low urine output, has not been drinking or eating adequately. Dr. Ann ordered NS at 50 ml/hr  " DISPLAY PLAN FREE TEXT

## 2022-02-05 PROBLEM — K91.89 POSTOPERATIVE ILEUS (HCC): Status: ACTIVE | Noted: 2022-02-05

## 2022-02-05 PROBLEM — N17.9 ACUTE RENAL FAILURE (HCC): Status: ACTIVE | Noted: 2022-02-05

## 2022-02-05 PROBLEM — K56.7 POSTOPERATIVE ILEUS (HCC): Status: ACTIVE | Noted: 2022-02-05

## 2022-02-05 PROBLEM — K92.0 COFFEE GROUND EMESIS: Status: ACTIVE | Noted: 2022-02-05

## 2022-02-05 LAB
ALBUMIN SERPL BCP-MCNC: 4.5 G/DL (ref 3.2–4.9)
ALBUMIN/GLOB SERPL: 1.9 G/DL
ALP SERPL-CCNC: 59 U/L (ref 30–99)
ALT SERPL-CCNC: 6 U/L (ref 2–50)
ANION GAP SERPL CALC-SCNC: 13 MMOL/L (ref 7–16)
AST SERPL-CCNC: 11 U/L (ref 12–45)
BASOPHILS # BLD AUTO: 0.2 % (ref 0–1.8)
BASOPHILS # BLD: 0.02 K/UL (ref 0–0.12)
BILIRUB SERPL-MCNC: 0.7 MG/DL (ref 0.1–1.5)
BUN SERPL-MCNC: 59 MG/DL (ref 8–22)
CALCIUM SERPL-MCNC: 9.2 MG/DL (ref 8.5–10.5)
CHLORIDE SERPL-SCNC: 100 MMOL/L (ref 96–112)
CO2 SERPL-SCNC: 24 MMOL/L (ref 20–33)
CREAT SERPL-MCNC: 1.85 MG/DL (ref 0.5–1.4)
EOSINOPHIL # BLD AUTO: 0 K/UL (ref 0–0.51)
EOSINOPHIL NFR BLD: 0 % (ref 0–6.9)
ERYTHROCYTE [DISTWIDTH] IN BLOOD BY AUTOMATED COUNT: 42.1 FL (ref 35.9–50)
GLOBULIN SER CALC-MCNC: 2.4 G/DL (ref 1.9–3.5)
GLUCOSE SERPL-MCNC: 123 MG/DL (ref 65–99)
HCT VFR BLD AUTO: 40.9 % (ref 42–52)
HCT VFR BLD AUTO: 40.9 % (ref 42–52)
HGB BLD-MCNC: 13.6 G/DL (ref 14–18)
HGB BLD-MCNC: 13.6 G/DL (ref 14–18)
IMM GRANULOCYTES # BLD AUTO: 0.03 K/UL (ref 0–0.11)
IMM GRANULOCYTES NFR BLD AUTO: 0.3 % (ref 0–0.9)
LYMPHOCYTES # BLD AUTO: 0.77 K/UL (ref 1–4.8)
LYMPHOCYTES NFR BLD: 6.8 % (ref 22–41)
MAGNESIUM SERPL-MCNC: 2.5 MG/DL (ref 1.5–2.5)
MCH RBC QN AUTO: 30 PG (ref 27–33)
MCHC RBC AUTO-ENTMCNC: 33.2 G/DL (ref 33.7–35.3)
MCV RBC AUTO: 90.3 FL (ref 81.4–97.8)
MONOCYTES # BLD AUTO: 1.44 K/UL (ref 0–0.85)
MONOCYTES NFR BLD AUTO: 12.7 % (ref 0–13.4)
NEUTROPHILS # BLD AUTO: 9.07 K/UL (ref 1.82–7.42)
NEUTROPHILS NFR BLD: 80 % (ref 44–72)
NRBC # BLD AUTO: 0 K/UL
NRBC BLD-RTO: 0 /100 WBC
PHOSPHATE SERPL-MCNC: 5.1 MG/DL (ref 2.5–4.5)
PLATELET # BLD AUTO: 178 K/UL (ref 164–446)
PMV BLD AUTO: 11.8 FL (ref 9–12.9)
POTASSIUM SERPL-SCNC: 4.4 MMOL/L (ref 3.6–5.5)
PROT SERPL-MCNC: 6.9 G/DL (ref 6–8.2)
RBC # BLD AUTO: 4.54 M/UL (ref 4.7–6.1)
SODIUM SERPL-SCNC: 137 MMOL/L (ref 135–145)
WBC # BLD AUTO: 11.3 K/UL (ref 4.8–10.8)

## 2022-02-05 PROCEDURE — 700111 HCHG RX REV CODE 636 W/ 250 OVERRIDE (IP): Performed by: STUDENT IN AN ORGANIZED HEALTH CARE EDUCATION/TRAINING PROGRAM

## 2022-02-05 PROCEDURE — 84100 ASSAY OF PHOSPHORUS: CPT

## 2022-02-05 PROCEDURE — 80053 COMPREHEN METABOLIC PANEL: CPT

## 2022-02-05 PROCEDURE — 85025 COMPLETE CBC W/AUTO DIFF WBC: CPT

## 2022-02-05 PROCEDURE — 770001 HCHG ROOM/CARE - MED/SURG/GYN PRIV*

## 2022-02-05 PROCEDURE — C9113 INJ PANTOPRAZOLE SODIUM, VIA: HCPCS | Performed by: STUDENT IN AN ORGANIZED HEALTH CARE EDUCATION/TRAINING PROGRAM

## 2022-02-05 PROCEDURE — 83735 ASSAY OF MAGNESIUM: CPT

## 2022-02-05 PROCEDURE — 99255 IP/OBS CONSLTJ NEW/EST HI 80: CPT | Performed by: HOSPITALIST

## 2022-02-05 RX ORDER — LEVETIRACETAM 500 MG/5ML
750 INJECTION, SOLUTION, CONCENTRATE INTRAVENOUS EVERY 12 HOURS
Status: DISCONTINUED | OUTPATIENT
Start: 2022-02-05 | End: 2022-02-07

## 2022-02-05 RX ADMIN — PANTOPRAZOLE SODIUM 40 MG: 40 INJECTION, POWDER, FOR SOLUTION INTRAVENOUS at 05:04

## 2022-02-05 RX ADMIN — LEVETIRACETAM 750 MG: 100 INJECTION, SOLUTION INTRAVENOUS at 09:03

## 2022-02-05 RX ADMIN — LEVETIRACETAM 750 MG: 100 INJECTION, SOLUTION INTRAVENOUS at 17:34

## 2022-02-05 RX ADMIN — PANTOPRAZOLE SODIUM 40 MG: 40 INJECTION, POWDER, FOR SOLUTION INTRAVENOUS at 17:34

## 2022-02-05 ASSESSMENT — PATIENT HEALTH QUESTIONNAIRE - PHQ9
SUM OF ALL RESPONSES TO PHQ9 QUESTIONS 1 AND 2: 0
2. FEELING DOWN, DEPRESSED, IRRITABLE, OR HOPELESS: NOT AT ALL
1. LITTLE INTEREST OR PLEASURE IN DOING THINGS: NOT AT ALL

## 2022-02-05 ASSESSMENT — ENCOUNTER SYMPTOMS
PALPITATIONS: 0
NAUSEA: 0
CHILLS: 0
ORTHOPNEA: 0
DIZZINESS: 0
COUGH: 0
VOMITING: 0
SPUTUM PRODUCTION: 0
HEMOPTYSIS: 0

## 2022-02-05 ASSESSMENT — PAIN DESCRIPTION - PAIN TYPE
TYPE: ACUTE PAIN
TYPE: ACUTE PAIN

## 2022-02-05 NOTE — PROGRESS NOTES
Notified on-call resident Linus Ann regarding pt having increased abd distention, semifirm hypoactive bowelsounds, now starting to have periods of nausea.  Attempted multiple stool softeners and laxatives, pt passing flatulence and small amount of hard stool., denied nausea throughout day. Attempted multiple times on toilet, unsuccessful. Awaiting KUB before ngtube placement

## 2022-02-05 NOTE — ASSESSMENT & PLAN NOTE
Patient developed projectile vomiting and abdominal distention postoperatively, likely due to ileus  Check CT scan of abdomen and pelvis to rule out obstruction

## 2022-02-05 NOTE — ASSESSMENT & PLAN NOTE
Patient admitted for scheduled craniectomy and evacuation of subdural hematoma  Worsened symptoms of left sided hand weakness/numbness today, I ordered a stat CT scan of the head  Blood pressure control

## 2022-02-05 NOTE — PROGRESS NOTES
Patient's epigastric pain is completely resolved.  Please consider GI consultation in AM for upper GI EGD, I will keep patient npo sips with meds at this time.

## 2022-02-05 NOTE — PROGRESS NOTES
Neurosurgery Progress Note    Subjective:  Pt doing well today. Headaches improved. Pt attributes overnight issues on ileus. Feels better after vomiting.  Nicardipine drip has been DCd    Exam:  AOx3  PERRL, EOMI  MAEx4, no drift  C/d/i  Mild numbness of fingertips R hand and decreased dexterity in limb, present prior to surgery    BP  Min: 106/53  Max: 152/71  Pulse  Av.5  Min: 56  Max: 94  Resp  Av  Min: 12  Max: 53  Temp  Av.8 °C (98.2 °F)  Min: 36.2 °C (97.2 °F)  Max: 37 °C (98.6 °F)  SpO2  Av.4 %  Min: 87 %  Max: 98 %    No data recorded    Recent Labs     22  1152 22  11522  0158   WBC 7.2  --  12.2*  --   --    RBC 5.26  --  5.32  --   --    HEMOGLOBIN 15.6   < > 15.9 14.1 13.6*  13.6*   HEMATOCRIT 45.7   < > 45.4 42.1 40.9*  40.9*   MCV 86.9  --  85.3  --   --    MCH 29.7  --  29.9  --   --    MCHC 34.1  --  35.0  --   --    RDW 38.7  --  37.8  --   --    PLATELETCT 167  --  192  --   --    MPV 11.1  --  10.7  --   --     < > = values in this interval not displayed.     Recent Labs     22  1152 22  03422  0402   SODIUM 143 143 137   POTASSIUM 4.3 3.9 4.4   CHLORIDE 106 104 100   CO2 24 22 24   GLUCOSE 99 150* 123*   BUN 24* 23* 59*   CREATININE 0.91 0.94 1.85*   CALCIUM 9.3 8.7 9.2     Recent Labs     22   APTT 30.1   INR 0.98           Intake/Output                       22 0700 - 22 0659 22 0700 - 22 0659     5947-7005 7142-0600 Total 3888-6112 7447-5575 Total                 Intake    I.V.  990  476 1466  --  -- --    Cardene Volume 490 76 566 -- -- --    Volume (mL) (NS infusion) 500 400 900 -- -- --    Other  120  -- 120  --  -- --    Medications (PO/Enteral Liquids) 120 -- 120 -- -- --    Total Intake 3280 847 7678 -- -- --       Output    Urine  600  350 950  --  -- --    Urine Void (mL) 300 350 650 -- -- --    Output (mL) ([REMOVED] Urethral Catheter Non-latex 16 Fr.) 300 --  300 -- -- --    Drains  12  -- 12  --  -- --    Output (mL) ([REMOVED] ICP/Ventriculostomy Right Temporal region) 12 -- 12 -- -- --    Emesis/NG output  --  100 100  --  -- --    Output (mL) (Enteral Tube 02/04/22 Nasogastric Right nare) -- 100 100 -- -- --    Total Output  -- -- --       Net I/O     498 26 524 -- -- --            Intake/Output Summary (Last 24 hours) at 2/5/2022 0948  Last data filed at 2/5/2022 0600  Gross per 24 hour   Intake 1136 ml   Output 750 ml   Net 386 ml            • levETIRAcetam (Keppra) IV  750 mg Q12HRS   • NS   Continuous   • artificial tears  1 Application Q8HRS PRN   • pantoprazole  40 mg BID   • prochlorperazine  10 mg Q6HRS PRN   • lidocaine  15 mL Q4HRS PRN   • DILTIAZem  30 mg BID   • lisinopril  20 mg DAILY   • simvastatin  20 mg Nightly   • Pharmacy Consult Request  1 Each PHARMACY TO DOSE   • MD ALERT...DO NOT ADMINISTER NSAIDS or ASPIRIN unless ORDERED By Neurosurgery  1 Each PRN   • ondansetron  4 mg Q4HRS PRN   • cloNIDine  0.1 mg Q4HRS PRN   • docusate sodium  100 mg BID   • senna-docusate  1 Tablet Nightly   • senna-docusate  1 Tablet Q24HRS PRN   • polyethylene glycol/lytes  1 Packet BID PRN   • magnesium hydroxide  30 mL QDAY PRN   • bisacodyl  10 mg Q24HRS PRN   • sodium phosphate  1 Each Once PRN   • HYDROcodone-acetaminophen  1 Tablet Q4HRS PRN   • HYDROcodone/acetaminophen  1 Tablet Q4HRS PRN   • oxyCODONE-acetaminophen  1 Tablet Q4HRS PRN   • HYDROmorphone  0.5 mg Q3HRS PRN   • benzocaine-menthol  1 Lozenge Q2HRS PRN   • diphenhydrAMINE  25 mg Q6HRS PRN    Or   • diphenhydrAMINE  25 mg Q6HRS PRN   • hydrALAZINE  20 mg Q HOUR PRN   • labetalol  10-20 mg Q HOUR PRN       Assessment and Plan:  Hospital day #3  POD #2 for R craniotomy and subdural drain for chronic SDH  Prophylactic anticoagulation: yes         Start date/time: today  Drain out  Ambulate   keppra 500 BID for 7 days  OK for transfer to floor  OK for q4h neuro checks  Appreciate continued  medical management  Likely home tomorrow if ileus resolves

## 2022-02-05 NOTE — PROGRESS NOTES
Pt cont to have distended abd. Denies pain with palpation in any quadrant. Hypoactive bowel sounds. Sennokot, Milk of mag, and miralax given. Pt cont to have flatulence with ambulation and time in bathroom. Ambulated several laps around unit, unsuccessful in bowel movement

## 2022-02-05 NOTE — CARE PLAN
The patient is Watcher - Medium risk of patient condition declining or worsening    Shift Goals  Clinical Goals: Decrease nausea, stomach decompression  Patient Goals: sit up and move    Progress made toward(s) clinical / shift goals:  Patient with NG tube in placed with intermittent suctioning. Coffee ground emesis, MD aware. Patient nausea has improved since NG placement, had a bowel movement.     Problem: Pain - Standard  Goal: Alleviation of pain or a reduction in pain to the patient’s comfort goal  Outcome: Progressing     Problem: Neuro Status  Goal: Neuro status will remain stable or improve  Outcome: Progressing     Problem: Gastrointestinal Irritability  Goal: Nausea and vomiting will be absent or improve  Outcome: Progressing

## 2022-02-05 NOTE — CONSULTS
Hospital Medicine Consultation    Date of Service  2/5/2022    Referring Physician  Dr. Dilip Kuo MD    Consulting Physician  Aaron Ramirez M.D.    Reason for Consultation  Subdural Hematoma    History of Presenting Illness  61 y.o. male who presented 2/3/2022 with left hand numbness and headaches, admitted for scheduled right craniotomy to evacuate subdural hematoma.    I have reviewed some of the recent provider documentation available to me in the patient's medical chart.  Records are briefly summarized: Mr Walker has past medical history that includes dyslipidemia, hypertension, and mitral valve replacement.  He was previously treated with Coumadin.  The patient was thrown by a horse in November 2021.  Several days later he developed a headache and outpatient CT scan was ordered demonstrating a right subdural hematoma.  The patient was sent to the emergency room for evaluation, he was admitted and neurosurgery was consulted.  Patient was discharged home on 12/31/2021 with close neurosurgical follow-up.  He did follow-up with Dr. Merino's office and a repeat CT scan on 1/14/2022 showed worsening, in addition the patient complained of some left hand numbness and headaches.  He elected to have intervention and on 2/3/2023 he underwent right-sided craniotomy for evacuation of subdural hematoma with subdural drain placement.  Patient was admitted to the ICU post procedure.  Patient initially required nicardipine drip to control his blood pressure.  In addition he has developed postoperative ileus and acute renal failure..    I was consulted by Dr. Kuo to assume care for this patient who can be transferred out of the ICU.  Hospital course, results of labs and imaging, and plan of care were discussed with the critical care team.    Patient seen and examined, bedside RN present.  He feels significantly better.  His abdomen is not painful, he feels it is slightly more distended than normal but is definitely improved.   NG tube in place with minimal clear output.  He has had no emesis since this morning.  Patient has had frequent liquid bowel movements through the day.  He denies headache, symptoms left hand have improved significantly.    Review of Systems  Review of Systems   Constitutional: Negative for chills and malaise/fatigue.   Respiratory: Negative for cough, hemoptysis and sputum production.    Cardiovascular: Negative for chest pain, palpitations and orthopnea.   Gastrointestinal: Negative for nausea and vomiting.   Skin: Negative for itching and rash.   Neurological: Negative for dizziness.   All other systems reviewed and are negative.      Past Medical History   has a past medical history of Heart valve disease (2017), Hemorrhagic disorder (HCC), High cholesterol, Hypertension, No known health problems, Primary pulmonary HTN (HCC), and Snoring.    Surgical History   has a past surgical history that includes coronart artery bypass, 2 (11/2017); other; and chavo holes (Right, 2/3/2022).    Family History  Father with cancer    Social History   reports that he has never smoked. He has quit using smokeless tobacco.  His smokeless tobacco use included chew. He reports previous alcohol use. He reports current drug use.    Medications  Prior to Admission Medications   Prescriptions Last Dose Informant Patient Reported? Taking?   DILTIAZem (CARDIZEM) 30 MG Tab 2/3/2022 at 1000 Patient Yes No   Sig: Take 30 mg by mouth 2 times a day.   Probiotic Product (PROBIOTIC PO) 2/2/2022 at am Patient Yes No   Sig: Take 1 Tablet by mouth every day.   acetaminophen (TYLENOL) 500 MG Tab 2/3/2022 at 1000 Patient No No   Sig: Take 2 Tablets by mouth every 6 hours.   Patient taking differently: Take 500-1,000 mg by mouth every 6 hours as needed (HA).   levETIRAcetam (KEPPRA) 500 MG Tab 1/31/2022 at am Patient Yes No   Sig: Take 500 mg by mouth 2 times a day.   lisinopril (PRINIVIL) 20 MG Tab 2/2/2022 at am Patient Yes No   Sig: Take 20 mg by  mouth every day.   simvastatin (ZOCOR) 5 MG Tab 2/2/2022 at pm Patient Yes No   Sig: Take 20 mg by mouth every evening.      Facility-Administered Medications: None       Allergies  No Known Allergies    Physical Exam  Temp:  [36.2 °C (97.2 °F)-37 °C (98.6 °F)] 36.7 °C (98 °F)  Pulse:  [56-94] 77  Resp:  [12-53] 24  BP: (106-152)/(53-77) 119/58  SpO2:  [87 %-98 %] 93 %    Physical Exam  Constitutional:       General: He is not in acute distress.     Appearance: Normal appearance. He is normal weight.   HENT:      Head: Normocephalic and atraumatic.      Right Ear: External ear normal.      Left Ear: External ear normal.      Nose: Nose normal.      Mouth/Throat:      Mouth: Mucous membranes are moist.      Pharynx: Oropharynx is clear.   Eyes:      Extraocular Movements: Extraocular movements intact.      Conjunctiva/sclera: Conjunctivae normal.      Pupils: Pupils are equal, round, and reactive to light.   Cardiovascular:      Rate and Rhythm: Normal rate and regular rhythm.      Pulses: Normal pulses.   Pulmonary:      Effort: Pulmonary effort is normal.      Breath sounds: Normal breath sounds.   Abdominal:      General: Abdomen is flat. Bowel sounds are normal. There is distension.      Palpations: Abdomen is soft.      Comments: Slight distention, soft, there is no rebound or guarding, he has normal bowel sounds in all quadrants   Musculoskeletal:         General: Normal range of motion.      Cervical back: Normal range of motion and neck supple.   Skin:     General: Skin is warm and dry.      Capillary Refill: Capillary refill takes less than 2 seconds.      Coloration: Skin is not jaundiced.   Neurological:      General: No focal deficit present.      Mental Status: He is alert and oriented to person, place, and time.      Cranial Nerves: No cranial nerve deficit.      Gait: Gait normal.   Psychiatric:         Mood and Affect: Mood normal.         Behavior: Behavior normal.         Fluids  Date 02/05/22 0700  - 02/06/22 0659   Shift 3563-5653 2970-4137 8647-0147 24 Hour Total   INTAKE   I.V. 100   100   Shift Total 100   100   OUTPUT   Urine 200   200   Shift Total 200   200   Weight (kg) 115.8 115.8 115.8 115.8       Laboratory  Recent Labs     02/04/22  0344 02/04/22 1952 02/05/22  0158   WBC 12.2*  --  11.3*   RBC 5.32  --  4.54*   HEMOGLOBIN 15.9 14.1 13.6*  13.6*   HEMATOCRIT 45.4 42.1 40.9*  40.9*   MCV 85.3  --  90.3   MCH 29.9  --  30.0   MCHC 35.0  --  33.2*   RDW 37.8  --  42.1   PLATELETCT 192  --  178   MPV 10.7  --  11.8     Recent Labs     02/04/22 0344 02/05/22  0402   SODIUM 143 137   POTASSIUM 3.9 4.4   CHLORIDE 104 100   CO2 22 24   GLUCOSE 150* 123*   BUN 23* 59*   CREATININE 0.94 1.85*   CALCIUM 8.7 9.2                     Imaging  [id number] VIEW   Final Result      Multiple dilated small bowel loops concerning for early or partial small bowel obstruction.      CT-HEAD W/O   Final Result         1.  Postsurgical changes of right parietal craniotomy with evacuation of subdural hematoma, residual 7.3 mm subacute appearing subdural hematoma is seen with subdural drain in place.   2.  Pneumocephalus, within expected limits for postop change   3.  Atherosclerosis.             Assessment/Plan  * Subdural hematoma (HCC)- (present on admission)  Assessment & Plan  Patient admitted for scheduled craniectomy and evacuation of subdural hematoma  No headache, left-sided neurologic symptoms have improved    Postoperative ileus (HCC)  Assessment & Plan  Patient developed projectile vomiting and abdominal distention postoperatively, likely due to ileus  Abdomen is still slightly distended but he has good bowel sounds, he has had multiple bowel movements and is no longer vomiting  Clamp NG tube for 6 hours, if no signs of nausea vomiting please remove  Start ice chips  If patient has recurrence of severe symptoms including nausea or worsening abdominal pain, obtain a CT scan of the abdomen pelvis to rule out  obstruction    Coffee ground emesis  Assessment & Plan  Resolved  Hemoglobin is stable    Acute renal failure (HCC)  Assessment & Plan  Likely related to hypovolemia and ileus  Gentle IV fluids  Avoid nephrotoxins  I ordered repeat labs to follow-up his response    Hypertensive urgency- (present on admission)  Assessment & Plan  Postoperatively the patient was extremely hypertensive, requiring nicardipine drip  Blood pressure is improved  Continue lisinopril

## 2022-02-05 NOTE — PROGRESS NOTES
Patient had coffee ground emesis.  Abdomen is distended, KUB shows early or partial small bowel obstruction, this aligns with POCUS abdominal findings of obstruction.  Patient is able to pass gas. But stools are hard  Trial of different types of meds did not help  Will do soap suds enema.  NG decompression  If patient continues to vomit, will certainly get CT abdomen  Patient has been on dexamethasone couple weeks ago, this increases risk of peptic ulcer disease. Will cover with PPI for coffee ground emesis and trend hemoglobin. Monitor hemodynamics.

## 2022-02-06 ENCOUNTER — APPOINTMENT (OUTPATIENT)
Dept: RADIOLOGY | Facility: MEDICAL CENTER | Age: 62
DRG: 026 | End: 2022-02-06
Attending: HOSPITALIST
Payer: COMMERCIAL

## 2022-02-06 LAB
ALBUMIN SERPL BCP-MCNC: 4 G/DL (ref 3.2–4.9)
ALBUMIN/GLOB SERPL: 1.8 G/DL
ALP SERPL-CCNC: 55 U/L (ref 30–99)
ALT SERPL-CCNC: <5 U/L (ref 2–50)
ANION GAP SERPL CALC-SCNC: 10 MMOL/L (ref 7–16)
AST SERPL-CCNC: 13 U/L (ref 12–45)
BASOPHILS # BLD AUTO: 0.7 % (ref 0–1.8)
BASOPHILS # BLD: 0.04 K/UL (ref 0–0.12)
BILIRUB SERPL-MCNC: 0.7 MG/DL (ref 0.1–1.5)
BUN SERPL-MCNC: 34 MG/DL (ref 8–22)
CALCIUM SERPL-MCNC: 8.9 MG/DL (ref 8.5–10.5)
CHLORIDE SERPL-SCNC: 107 MMOL/L (ref 96–112)
CO2 SERPL-SCNC: 23 MMOL/L (ref 20–33)
CREAT SERPL-MCNC: 0.95 MG/DL (ref 0.5–1.4)
EOSINOPHIL # BLD AUTO: 0.03 K/UL (ref 0–0.51)
EOSINOPHIL NFR BLD: 0.5 % (ref 0–6.9)
ERYTHROCYTE [DISTWIDTH] IN BLOOD BY AUTOMATED COUNT: 41.2 FL (ref 35.9–50)
GLOBULIN SER CALC-MCNC: 2.2 G/DL (ref 1.9–3.5)
GLUCOSE BLD-MCNC: 90 MG/DL (ref 65–99)
GLUCOSE SERPL-MCNC: 95 MG/DL (ref 65–99)
HCT VFR BLD AUTO: 40.4 % (ref 42–52)
HGB BLD-MCNC: 13.3 G/DL (ref 14–18)
IMM GRANULOCYTES # BLD AUTO: 0.02 K/UL (ref 0–0.11)
IMM GRANULOCYTES NFR BLD AUTO: 0.4 % (ref 0–0.9)
LYMPHOCYTES # BLD AUTO: 0.61 K/UL (ref 1–4.8)
LYMPHOCYTES NFR BLD: 10.8 % (ref 22–41)
MAGNESIUM SERPL-MCNC: 2.4 MG/DL (ref 1.5–2.5)
MCH RBC QN AUTO: 29.6 PG (ref 27–33)
MCHC RBC AUTO-ENTMCNC: 32.9 G/DL (ref 33.7–35.3)
MCV RBC AUTO: 90 FL (ref 81.4–97.8)
MONOCYTES # BLD AUTO: 0.75 K/UL (ref 0–0.85)
MONOCYTES NFR BLD AUTO: 13.3 % (ref 0–13.4)
NEUTROPHILS # BLD AUTO: 4.2 K/UL (ref 1.82–7.42)
NEUTROPHILS NFR BLD: 74.3 % (ref 44–72)
NRBC # BLD AUTO: 0 K/UL
NRBC BLD-RTO: 0 /100 WBC
PHOSPHATE SERPL-MCNC: 2.9 MG/DL (ref 2.5–4.5)
PLATELET # BLD AUTO: 126 K/UL (ref 164–446)
PMV BLD AUTO: 11.4 FL (ref 9–12.9)
POTASSIUM SERPL-SCNC: 4.5 MMOL/L (ref 3.6–5.5)
PROT SERPL-MCNC: 6.2 G/DL (ref 6–8.2)
RBC # BLD AUTO: 4.49 M/UL (ref 4.7–6.1)
SODIUM SERPL-SCNC: 140 MMOL/L (ref 135–145)
WBC # BLD AUTO: 5.7 K/UL (ref 4.8–10.8)

## 2022-02-06 PROCEDURE — 700111 HCHG RX REV CODE 636 W/ 250 OVERRIDE (IP): Performed by: STUDENT IN AN ORGANIZED HEALTH CARE EDUCATION/TRAINING PROGRAM

## 2022-02-06 PROCEDURE — A9270 NON-COVERED ITEM OR SERVICE: HCPCS | Performed by: STUDENT IN AN ORGANIZED HEALTH CARE EDUCATION/TRAINING PROGRAM

## 2022-02-06 PROCEDURE — 700102 HCHG RX REV CODE 250 W/ 637 OVERRIDE(OP): Performed by: STUDENT IN AN ORGANIZED HEALTH CARE EDUCATION/TRAINING PROGRAM

## 2022-02-06 PROCEDURE — 82962 GLUCOSE BLOOD TEST: CPT

## 2022-02-06 PROCEDURE — 83735 ASSAY OF MAGNESIUM: CPT

## 2022-02-06 PROCEDURE — 700105 HCHG RX REV CODE 258: Performed by: STUDENT IN AN ORGANIZED HEALTH CARE EDUCATION/TRAINING PROGRAM

## 2022-02-06 PROCEDURE — 700105 HCHG RX REV CODE 258: Performed by: HOSPITALIST

## 2022-02-06 PROCEDURE — 700101 HCHG RX REV CODE 250: Performed by: HOSPITALIST

## 2022-02-06 PROCEDURE — 700111 HCHG RX REV CODE 636 W/ 250 OVERRIDE (IP): Performed by: INTERNAL MEDICINE

## 2022-02-06 PROCEDURE — 700102 HCHG RX REV CODE 250 W/ 637 OVERRIDE(OP): Performed by: HOSPITALIST

## 2022-02-06 PROCEDURE — 70450 CT HEAD/BRAIN W/O DYE: CPT

## 2022-02-06 PROCEDURE — 84100 ASSAY OF PHOSPHORUS: CPT

## 2022-02-06 PROCEDURE — A9270 NON-COVERED ITEM OR SERVICE: HCPCS | Performed by: HOSPITALIST

## 2022-02-06 PROCEDURE — 770001 HCHG ROOM/CARE - MED/SURG/GYN PRIV*

## 2022-02-06 PROCEDURE — 85025 COMPLETE CBC W/AUTO DIFF WBC: CPT

## 2022-02-06 PROCEDURE — 700117 HCHG RX CONTRAST REV CODE 255: Performed by: HOSPITALIST

## 2022-02-06 PROCEDURE — C9113 INJ PANTOPRAZOLE SODIUM, VIA: HCPCS | Performed by: STUDENT IN AN ORGANIZED HEALTH CARE EDUCATION/TRAINING PROGRAM

## 2022-02-06 PROCEDURE — 700111 HCHG RX REV CODE 636 W/ 250 OVERRIDE (IP): Performed by: NURSE PRACTITIONER

## 2022-02-06 PROCEDURE — 80053 COMPREHEN METABOLIC PANEL: CPT

## 2022-02-06 PROCEDURE — 74177 CT ABD & PELVIS W/CONTRAST: CPT

## 2022-02-06 PROCEDURE — 99233 SBSQ HOSP IP/OBS HIGH 50: CPT | Performed by: HOSPITALIST

## 2022-02-06 PROCEDURE — 51798 US URINE CAPACITY MEASURE: CPT

## 2022-02-06 RX ORDER — MORPHINE SULFATE 4 MG/ML
4 INJECTION INTRAVENOUS ONCE
Status: COMPLETED | OUTPATIENT
Start: 2022-02-06 | End: 2022-02-06

## 2022-02-06 RX ORDER — HYDROXYZINE HYDROCHLORIDE 10 MG/1
10 TABLET, FILM COATED ORAL ONCE
Status: COMPLETED | OUTPATIENT
Start: 2022-02-06 | End: 2022-02-06

## 2022-02-06 RX ORDER — CHOLECALCIFEROL (VITAMIN D3) 125 MCG
5 CAPSULE ORAL ONCE
Status: COMPLETED | OUTPATIENT
Start: 2022-02-06 | End: 2022-02-06

## 2022-02-06 RX ORDER — LISINOPRIL 20 MG/1
20 TABLET ORAL ONCE
Status: COMPLETED | OUTPATIENT
Start: 2022-02-06 | End: 2022-02-06

## 2022-02-06 RX ADMIN — LABETALOL HYDROCHLORIDE 10 MG: 5 INJECTION INTRAVENOUS at 13:00

## 2022-02-06 RX ADMIN — Medication 5 MG: at 20:15

## 2022-02-06 RX ADMIN — PANTOPRAZOLE SODIUM 40 MG: 40 INJECTION, POWDER, FOR SOLUTION INTRAVENOUS at 05:31

## 2022-02-06 RX ADMIN — LEVETIRACETAM 750 MG: 100 INJECTION, SOLUTION INTRAVENOUS at 05:31

## 2022-02-06 RX ADMIN — MORPHINE SULFATE 4 MG: 4 INJECTION INTRAVENOUS at 17:14

## 2022-02-06 RX ADMIN — HYDRALAZINE HYDROCHLORIDE 20 MG: 20 INJECTION INTRAMUSCULAR; INTRAVENOUS at 15:48

## 2022-02-06 RX ADMIN — SIMVASTATIN 20 MG: 20 TABLET, FILM COATED ORAL at 20:15

## 2022-02-06 RX ADMIN — PROCHLORPERAZINE EDISYLATE 10 MG: 5 INJECTION INTRAMUSCULAR; INTRAVENOUS at 17:11

## 2022-02-06 RX ADMIN — IOHEXOL 25 ML: 240 INJECTION, SOLUTION INTRATHECAL; INTRAVASCULAR; INTRAVENOUS; ORAL at 17:35

## 2022-02-06 RX ADMIN — NICARDIPINE HYDROCHLORIDE 12.5 MG/HR: 25 INJECTION, SOLUTION INTRAVENOUS at 18:48

## 2022-02-06 RX ADMIN — LABETALOL HYDROCHLORIDE 20 MG: 5 INJECTION INTRAVENOUS at 17:45

## 2022-02-06 RX ADMIN — LABETALOL HYDROCHLORIDE 10 MG: 5 INJECTION INTRAVENOUS at 13:11

## 2022-02-06 RX ADMIN — LISINOPRIL 20 MG: 20 TABLET ORAL at 12:25

## 2022-02-06 RX ADMIN — NICARDIPINE HYDROCHLORIDE 5 MG/HR: 25 INJECTION, SOLUTION INTRAVENOUS at 21:08

## 2022-02-06 RX ADMIN — HYDRALAZINE HYDROCHLORIDE 20 MG: 20 INJECTION INTRAMUSCULAR; INTRAVENOUS at 12:05

## 2022-02-06 RX ADMIN — SODIUM CHLORIDE: 9 INJECTION, SOLUTION INTRAVENOUS at 02:01

## 2022-02-06 RX ADMIN — LISINOPRIL 20 MG: 20 TABLET ORAL at 09:05

## 2022-02-06 RX ADMIN — PANTOPRAZOLE SODIUM 40 MG: 40 INJECTION, POWDER, FOR SOLUTION INTRAVENOUS at 17:51

## 2022-02-06 RX ADMIN — LEVETIRACETAM 750 MG: 100 INJECTION, SOLUTION INTRAVENOUS at 17:53

## 2022-02-06 RX ADMIN — IOHEXOL 100 ML: 350 INJECTION, SOLUTION INTRAVENOUS at 17:34

## 2022-02-06 RX ADMIN — NICARDIPINE HYDROCHLORIDE 15 MG/HR: 25 INJECTION, SOLUTION INTRAVENOUS at 16:38

## 2022-02-06 RX ADMIN — HYDROXYZINE HYDROCHLORIDE 10 MG: 10 TABLET ORAL at 20:15

## 2022-02-06 RX ADMIN — DILTIAZEM HYDROCHLORIDE 30 MG: 30 TABLET, FILM COATED ORAL at 20:16

## 2022-02-06 RX ADMIN — HYDRALAZINE HYDROCHLORIDE 20 MG: 20 INJECTION INTRAMUSCULAR; INTRAVENOUS at 07:57

## 2022-02-06 RX ADMIN — NICARDIPINE HYDROCHLORIDE 2 MG/HR: 25 INJECTION, SOLUTION INTRAVENOUS at 14:05

## 2022-02-06 RX ADMIN — ONDANSETRON 4 MG: 2 INJECTION INTRAMUSCULAR; INTRAVENOUS at 14:31

## 2022-02-06 ASSESSMENT — ENCOUNTER SYMPTOMS
CHILLS: 0
NAUSEA: 1
HEMOPTYSIS: 0
SPUTUM PRODUCTION: 0
VOMITING: 0
DIZZINESS: 0
DIAPHORESIS: 1
FOCAL WEAKNESS: 1
ORTHOPNEA: 0
ABDOMINAL PAIN: 1
COUGH: 0
PALPITATIONS: 0

## 2022-02-06 NOTE — PROGRESS NOTES
Pt has ambulated around unit multiple times, steady gait noted. Pt abdomen decreased distension and rigidity, now softer,bowel sounds hyperactive. Pt having multiple liquid stools. Ng tube has been clamped since 1230, denies being nauseated, has tolerated ice chips this afternoon

## 2022-02-06 NOTE — HOSPITAL COURSE
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  He was previously treated with Coumadin.  The patient was thrown by a horse in November 2021.  Several days later he developed a headache and outpatient CT scan was ordered demonstrating a right subdural hematoma.  The patient was sent to the emergency room for evaluation, he was admitted and neurosurgery was consulted.  Patient was discharged home on 12/31/2021 with close neurosurgical follow-up.  He did follow-up with Dr. Merino's office and a repeat CT scan on 1/14/2022 showed worsening, in addition the patient complained of some left hand numbness and headaches.  He elected to have intervention and on 2/3/2023 he underwent right-sided craniotomy for evacuation of subdural hematoma with subdural drain placement.  Patient was admitted to the ICU post procedure.  Patient initially required nicardipine drip to control his blood pressure.  In addition he has developed postoperative ileus and acute renal failure.

## 2022-02-06 NOTE — CARE PLAN
The patient is Stable - Low risk of patient condition declining or worsening    Shift Goals  Clinical Goals: Stable neuro exam   Patient Goals: rest/sleep  Family Goals: MARCELINO    Progress made toward(s) clinical / shift goals:      Problem: Pain - Standard  Goal: Alleviation of pain or a reduction in pain to the patient’s comfort goal  Outcome: Progressing     Problem: Neuro Status  Goal: Neuro status will remain stable or improve  Outcome: Progressing     Problem: Gastrointestinal Irritability  Goal: Nausea and vomiting will be absent or improve  Outcome: Progressing     Patient is not progressing towards the following goals:

## 2022-02-06 NOTE — PROGRESS NOTES
[facility] Medicine Daily Progress Note    Date of Service  2/6/2022    Chief Complaint  Will Walker is a 61 y.o. male admitted 2/3/2022 with left hand weakness and numbness    Hospital Course   Walker has past medical history that includes dyslipidemia, hypertension, and mitral valve replacement.  He was previously treated with Coumadin.  The patient was thrown by a horse in November 2021.  Several days later he developed a headache and outpatient CT scan was ordered demonstrating a right subdural hematoma.  The patient was sent to the emergency room for evaluation, he was admitted and neurosurgery was consulted.  Patient was discharged home on 12/31/2021 with close neurosurgical follow-up.  He did follow-up with Dr. Merino's office and a repeat CT scan on 1/14/2022 showed worsening, in addition the patient complained of some left hand numbness and headaches.  He elected to have intervention and on 2/3/2023 he underwent right-sided craniotomy for evacuation of subdural hematoma with subdural drain placement.  Patient was admitted to the ICU post procedure.  Patient initially required nicardipine drip to control his blood pressure.  In addition he has developed postoperative ileus and acute renal failure.    Interval Problem Update  Patient was seen this morning, improvement in abdominal symptoms, diet advanced to clear liquid  I was called at approximately 1:15pm for uncontrolled hypertension with SBP over 200, worsened left hand symptoms and diaphoresis/flushing.  Additional lisinopril was given as well as 1 dose of hydralazine and 2 doses of labetolol.  SBP remained 200 so nicardipine drip ordered    I have personally seen and examined the patient at bedside. I discussed the plan of care with patient, family, bedside RN and critical care.    Consultants/Specialty  critical care and neurosurgery    Code Status  Full Code    Disposition  Patient is not medically cleared for discharge.   Anticipate discharge to  to home with close outpatient follow-up.  I have placed the appropriate orders for post-discharge needs.    Review of Systems  Review of Systems   Constitutional: Positive for diaphoresis. Negative for chills and malaise/fatigue.   Respiratory: Negative for cough, hemoptysis and sputum production.    Cardiovascular: Negative for chest pain, palpitations and orthopnea.   Gastrointestinal: Positive for abdominal pain and nausea. Negative for vomiting.   Skin: Negative for itching and rash.   Neurological: Positive for focal weakness. Negative for dizziness.   All other systems reviewed and are negative.       Physical Exam  Temp:  [36.2 °C (97.2 °F)-37.3 °C (99.1 °F)] 37.3 °C (99.1 °F)  Pulse:  [57-77] 76  Resp:  [18-23] 18  BP: (129-196)/(60-83) 176/72  SpO2:  [91 %-95 %] 92 %    Physical Exam  Constitutional:       General: He is not in acute distress.     Appearance: Normal appearance. He is normal weight.   HENT:      Head: Normocephalic and atraumatic.      Right Ear: External ear normal.      Left Ear: External ear normal.      Nose: Nose normal.      Mouth/Throat:      Mouth: Mucous membranes are moist.      Pharynx: Oropharynx is clear.   Eyes:      Extraocular Movements: Extraocular movements intact.      Conjunctiva/sclera: Conjunctivae normal.      Pupils: Pupils are equal, round, and reactive to light.   Cardiovascular:      Rate and Rhythm: Normal rate and regular rhythm.      Pulses: Normal pulses.   Pulmonary:      Effort: Pulmonary effort is normal.      Breath sounds: Normal breath sounds.   Abdominal:      General: Abdomen is flat. Bowel sounds are normal. There is distension.      Palpations: Abdomen is soft.      Tenderness: There is no abdominal tenderness. There is no guarding.      Comments: Hyperactive bowel sounds   Musculoskeletal:         General: Normal range of motion.      Cervical back: Normal range of motion and neck supple.   Skin:     General: Skin is warm and dry.      Capillary  Refill: Capillary refill takes less than 2 seconds.      Coloration: Skin is not jaundiced.   Neurological:      General: No focal deficit present.      Mental Status: He is alert and oriented to person, place, and time.      Cranial Nerves: No cranial nerve deficit.      Gait: Gait normal.      Comments: Mild weakness and decreased coordination left hand   Psychiatric:         Mood and Affect: Mood normal.         Behavior: Behavior normal.         Fluids    Intake/Output Summary (Last 24 hours) at 2/6/2022 1404  Last data filed at 2/6/2022 0900  Gross per 24 hour   Intake 1060 ml   Output 1140 ml   Net -80 ml       Laboratory  Recent Labs     02/04/22  0344 02/04/22  0344 02/04/22  1952 02/05/22  0158 02/06/22  1328   WBC 12.2*  --   --  11.3* 5.7   RBC 5.32  --   --  4.54* 4.49*   HEMOGLOBIN 15.9   < > 14.1 13.6*  13.6* 13.3*   HEMATOCRIT 45.4   < > 42.1 40.9*  40.9* 40.4*   MCV 85.3  --   --  90.3 90.0   MCH 29.9  --   --  30.0 29.6   MCHC 35.0  --   --  33.2* 32.9*   RDW 37.8  --   --  42.1 41.2   PLATELETCT 192  --   --  178 126*   MPV 10.7  --   --  11.8 11.4    < > = values in this interval not displayed.     Recent Labs     02/04/22  0344 02/05/22  0402 02/06/22  0400   SODIUM 143 137 140   POTASSIUM 3.9 4.4 4.5   CHLORIDE 104 100 107   CO2 22 24 23   GLUCOSE 150* 123* 95   BUN 23* 59* 34*   CREATININE 0.94 1.85* 0.95   CALCIUM 8.7 9.2 8.9                   Imaging  CT-HEAD W/O   Final Result      1.  Removal of right subdural drain without simply changed in size in relatively low attenuation right frontoparietal subdural fluid collection measuring up to 9 mm in width suggesting chronic subdural hematoma or hygroma.      2.  Decreased attenuation in the right frontoparietal cortex extending to the deep subcortical white matter could indicate evolving infarction/developing encephalomalacia.      3.  Persistent mass effect without midline shift.         [id number] VIEW   Final Result      Multiple dilated  small bowel loops concerning for early or partial small bowel obstruction.      CT-HEAD W/O   Final Result         1.  Postsurgical changes of right parietal craniotomy with evacuation of subdural hematoma, residual 7.3 mm subacute appearing subdural hematoma is seen with subdural drain in place.   2.  Pneumocephalus, within expected limits for postop change   3.  Atherosclerosis.         CT-ABDOMEN-PELVIS WITH    (Results Pending)        Assessment/Plan  * Subdural hematoma (HCC)- (present on admission)  Assessment & Plan  Patient admitted for scheduled craniectomy and evacuation of subdural hematoma  Worsened symptoms of left sided hand weakness/numbness today, I ordered a stat CT scan of the head  Blood pressure control    Postoperative ileus (HCC)  Assessment & Plan  Patient developed projectile vomiting and abdominal distention postoperatively, likely due to ileus  Check CT scan of abdomen and pelvis to rule out obstruction    Coffee ground emesis  Assessment & Plan  Resolved  Hemoglobin is stable  Continue PPi    Acute renal failure (HCC)  Assessment & Plan  Improved today    Hypertensive urgency- (present on admission)  Assessment & Plan  Developed recurrence of hypertension today, required multiple PRN's IV       VTE prophylaxis: SCDs/TEDs    I have performed a physical exam and reviewed and updated ROS and Plan today (2/6/2022). In review of yesterday's note (2/5/2022), there are no changes except as documented above.

## 2022-02-06 NOTE — PROGRESS NOTES
Dr. Ramirez notified this RN that he and Dr. Kuo reviewed patient's Head CT, which is stable. Critical Care MD to take over care of patient due to initiation of Cardene gtt.

## 2022-02-06 NOTE — PROGRESS NOTES
Neurosurgery Progress Note    Subjective:  POD 3 R Craniotomy for SDH   Headaches improved. Pt attributes overnight issues on ileus. Feels better after vomiting.   Nicardipine drip has been DCd, NG tube out   C/o mild L hand numbness that is subjectively worse    Exam:  AOx3  PERRL, EOMI  MAEx4, no drift  C/d/i  Mild numbness of fingertips L hand and decreased dexterity in limb, present prior to surgery    BP  Min: 129/62  Max: 206/86  Pulse  Av.4  Min: 57  Max: 93  Resp  Av.2  Min: 15  Max: 32  Temp  Av.8 °C (98.3 °F)  Min: 36.2 °C (97.2 °F)  Max: 37.3 °C (99.1 °F)  SpO2  Av %  Min: 91 %  Max: 98 %    No data recorded    Recent Labs     22  03422  0344 22  1952 22  0158 22  1328   WBC 12.2*  --   --  11.3* 5.7   RBC 5.32  --   --  4.54* 4.49*   HEMOGLOBIN 15.9   < > 14.1 13.6*  13.6* 13.3*   HEMATOCRIT 45.4   < > 42.1 40.9*  40.9* 40.4*   MCV 85.3  --   --  90.3 90.0   MCH 29.9  --   --  30.0 29.6   MCHC 35.0  --   --  33.2* 32.9*   RDW 37.8  --   --  42.1 41.2   PLATELETCT 192  --   --  178 126*   MPV 10.7  --   --  11.8 11.4    < > = values in this interval not displayed.     Recent Labs     22  0344 22  0402 22  0400   SODIUM 143 137 140   POTASSIUM 3.9 4.4 4.5   CHLORIDE 104 100 107   CO2 22 24 23   GLUCOSE 150* 123* 95   BUN 23* 59* 34*   CREATININE 0.94 1.85* 0.95   CALCIUM 8.7 9.2 8.9               Intake/Output                       22 0700 - 22 0659 22 0700 - 2259      Total  Total                 Intake    P.O.  --  -- --  60  -- 60    P.O. -- -- -- 60 -- 60    I.V.  300  850 1150  150  -- 150    Volume (mL) (NS infusion)  150 -- 150    Total Intake  210 -- 210       Output    Urine  750  790 1540  450  -- 450    Number of Times Voided 14 x -- 14 x 4 x -- 4 x    Urine Void (mL)  450 -- 450    Stool  --  -- --  --  -- --    Number of  Times Stooled 21 x -- 21 x 1 x -- 1 x    Total Output  450 -- 450       Net I/O     -450 60 -390 -240 -- -240            Intake/Output Summary (Last 24 hours) at 2/6/2022 1505  Last data filed at 2/6/2022 1400  Gross per 24 hour   Intake 1060 ml   Output 1440 ml   Net -380 ml       $ Bladder Scan Results (mL): 250    • niCARdipine infusion  0-15 mg/hr Continuous   • levETIRAcetam (Keppra) IV  750 mg Q12HRS   • artificial tears  1 Application Q8HRS PRN   • pantoprazole  40 mg BID   • prochlorperazine  10 mg Q6HRS PRN   • lidocaine  15 mL Q4HRS PRN   • DILTIAZem  30 mg BID   • lisinopril  20 mg DAILY   • simvastatin  20 mg Nightly   • Pharmacy Consult Request  1 Each PHARMACY TO DOSE   • MD ALERT...DO NOT ADMINISTER NSAIDS or ASPIRIN unless ORDERED By Neurosurgery  1 Each PRN   • ondansetron  4 mg Q4HRS PRN   • cloNIDine  0.1 mg Q4HRS PRN   • docusate sodium  100 mg BID   • senna-docusate  1 Tablet Nightly   • senna-docusate  1 Tablet Q24HRS PRN   • polyethylene glycol/lytes  1 Packet BID PRN   • magnesium hydroxide  30 mL QDAY PRN   • bisacodyl  10 mg Q24HRS PRN   • sodium phosphate  1 Each Once PRN   • oxyCODONE-acetaminophen  1 Tablet Q4HRS PRN   • benzocaine-menthol  1 Lozenge Q2HRS PRN   • diphenhydrAMINE  25 mg Q6HRS PRN    Or   • diphenhydrAMINE  25 mg Q6HRS PRN   • hydrALAZINE  20 mg Q HOUR PRN   • labetalol  10-20 mg Q HOUR PRN       Assessment and Plan:  Hospital day #4  POD #3 for R craniotomy and subdural drain for chronic SDH         Drain out  Ambulate   keppra 500 BID for 7 days  OK for transfer to floor  OK for q4h neuro checks  Appreciate continued medical management  CT head reviewed and stable 2/6/22  OK to DC patient to home from surgery standpoint, pending medical clearance       Prophylactic anticoagulation: yes

## 2022-02-07 VITALS
OXYGEN SATURATION: 95 % | RESPIRATION RATE: 15 BRPM | HEART RATE: 75 BPM | BODY MASS INDEX: 33.83 KG/M2 | WEIGHT: 255.29 LBS | HEIGHT: 73 IN | DIASTOLIC BLOOD PRESSURE: 65 MMHG | TEMPERATURE: 98.7 F | SYSTOLIC BLOOD PRESSURE: 152 MMHG

## 2022-02-07 PROBLEM — I16.0 HYPERTENSIVE URGENCY: Status: RESOLVED | Noted: 2022-02-03 | Resolved: 2022-02-07

## 2022-02-07 PROBLEM — K56.7 POSTOPERATIVE ILEUS (HCC): Status: RESOLVED | Noted: 2022-02-05 | Resolved: 2022-02-07

## 2022-02-07 PROBLEM — N17.9 ACUTE RENAL FAILURE (HCC): Status: RESOLVED | Noted: 2022-02-05 | Resolved: 2022-02-07

## 2022-02-07 PROBLEM — S06.5XAA SUBDURAL HEMATOMA (HCC): Status: RESOLVED | Noted: 2022-02-03 | Resolved: 2022-02-07

## 2022-02-07 PROBLEM — K92.0 COFFEE GROUND EMESIS: Status: RESOLVED | Noted: 2022-02-05 | Resolved: 2022-02-07

## 2022-02-07 PROBLEM — K91.89 POSTOPERATIVE ILEUS (HCC): Status: RESOLVED | Noted: 2022-02-05 | Resolved: 2022-02-07

## 2022-02-07 LAB
ALBUMIN SERPL BCP-MCNC: 4 G/DL (ref 3.2–4.9)
ALBUMIN/GLOB SERPL: 1.7 G/DL
ALP SERPL-CCNC: 58 U/L (ref 30–99)
ALT SERPL-CCNC: 6 U/L (ref 2–50)
ANION GAP SERPL CALC-SCNC: 14 MMOL/L (ref 7–16)
AST SERPL-CCNC: 12 U/L (ref 12–45)
BASOPHILS # BLD AUTO: 0.5 % (ref 0–1.8)
BASOPHILS # BLD: 0.03 K/UL (ref 0–0.12)
BILIRUB SERPL-MCNC: 0.6 MG/DL (ref 0.1–1.5)
BUN SERPL-MCNC: 20 MG/DL (ref 8–22)
CALCIUM SERPL-MCNC: 8.6 MG/DL (ref 8.5–10.5)
CHLORIDE SERPL-SCNC: 104 MMOL/L (ref 96–112)
CO2 SERPL-SCNC: 22 MMOL/L (ref 20–33)
CREAT SERPL-MCNC: 0.9 MG/DL (ref 0.5–1.4)
EOSINOPHIL # BLD AUTO: 0.13 K/UL (ref 0–0.51)
EOSINOPHIL NFR BLD: 2.2 % (ref 0–6.9)
ERYTHROCYTE [DISTWIDTH] IN BLOOD BY AUTOMATED COUNT: 40.8 FL (ref 35.9–50)
GLOBULIN SER CALC-MCNC: 2.3 G/DL (ref 1.9–3.5)
GLUCOSE SERPL-MCNC: 113 MG/DL (ref 65–99)
HCT VFR BLD AUTO: 37.7 % (ref 42–52)
HEMOCCULT STL QL: POSITIVE
HGB BLD-MCNC: 12.5 G/DL (ref 14–18)
IMM GRANULOCYTES # BLD AUTO: 0.02 K/UL (ref 0–0.11)
IMM GRANULOCYTES NFR BLD AUTO: 0.3 % (ref 0–0.9)
LYMPHOCYTES # BLD AUTO: 0.89 K/UL (ref 1–4.8)
LYMPHOCYTES NFR BLD: 15.4 % (ref 22–41)
MAGNESIUM SERPL-MCNC: 2.2 MG/DL (ref 1.5–2.5)
MCH RBC QN AUTO: 29.6 PG (ref 27–33)
MCHC RBC AUTO-ENTMCNC: 33.2 G/DL (ref 33.7–35.3)
MCV RBC AUTO: 89.3 FL (ref 81.4–97.8)
MONOCYTES # BLD AUTO: 0.79 K/UL (ref 0–0.85)
MONOCYTES NFR BLD AUTO: 13.6 % (ref 0–13.4)
NEUTROPHILS # BLD AUTO: 3.93 K/UL (ref 1.82–7.42)
NEUTROPHILS NFR BLD: 68 % (ref 44–72)
NRBC # BLD AUTO: 0 K/UL
NRBC BLD-RTO: 0 /100 WBC
PHOSPHATE SERPL-MCNC: 4.1 MG/DL (ref 2.5–4.5)
PLATELET # BLD AUTO: 148 K/UL (ref 164–446)
PMV BLD AUTO: 10.9 FL (ref 9–12.9)
POTASSIUM SERPL-SCNC: 3.7 MMOL/L (ref 3.6–5.5)
PROT SERPL-MCNC: 6.3 G/DL (ref 6–8.2)
RBC # BLD AUTO: 4.22 M/UL (ref 4.7–6.1)
SODIUM SERPL-SCNC: 140 MMOL/L (ref 135–145)
WBC # BLD AUTO: 5.8 K/UL (ref 4.8–10.8)

## 2022-02-07 PROCEDURE — 700111 HCHG RX REV CODE 636 W/ 250 OVERRIDE (IP): Performed by: STUDENT IN AN ORGANIZED HEALTH CARE EDUCATION/TRAINING PROGRAM

## 2022-02-07 PROCEDURE — 99239 HOSP IP/OBS DSCHRG MGMT >30: CPT | Performed by: HOSPITALIST

## 2022-02-07 PROCEDURE — 83735 ASSAY OF MAGNESIUM: CPT

## 2022-02-07 PROCEDURE — A9270 NON-COVERED ITEM OR SERVICE: HCPCS | Performed by: STUDENT IN AN ORGANIZED HEALTH CARE EDUCATION/TRAINING PROGRAM

## 2022-02-07 PROCEDURE — 80053 COMPREHEN METABOLIC PANEL: CPT

## 2022-02-07 PROCEDURE — 85025 COMPLETE CBC W/AUTO DIFF WBC: CPT

## 2022-02-07 PROCEDURE — 82272 OCCULT BLD FECES 1-3 TESTS: CPT

## 2022-02-07 PROCEDURE — 700102 HCHG RX REV CODE 250 W/ 637 OVERRIDE(OP): Performed by: STUDENT IN AN ORGANIZED HEALTH CARE EDUCATION/TRAINING PROGRAM

## 2022-02-07 PROCEDURE — 84100 ASSAY OF PHOSPHORUS: CPT

## 2022-02-07 PROCEDURE — C9113 INJ PANTOPRAZOLE SODIUM, VIA: HCPCS | Performed by: STUDENT IN AN ORGANIZED HEALTH CARE EDUCATION/TRAINING PROGRAM

## 2022-02-07 RX ORDER — LISINOPRIL 40 MG/1
40 TABLET ORAL DAILY
Qty: 30 TABLET | Refills: 1 | Status: SHIPPED | OUTPATIENT
Start: 2022-02-08 | End: 2022-02-09

## 2022-02-07 RX ORDER — OMEPRAZOLE 20 MG/1
20 CAPSULE, DELAYED RELEASE ORAL DAILY
Status: DISCONTINUED | OUTPATIENT
Start: 2022-02-08 | End: 2022-02-07 | Stop reason: HOSPADM

## 2022-02-07 RX ORDER — LEVETIRACETAM 500 MG/1
500 TABLET ORAL 2 TIMES DAILY
Qty: 8 TABLET | Refills: 0 | Status: SHIPPED | OUTPATIENT
Start: 2022-02-07 | End: 2022-02-19

## 2022-02-07 RX ORDER — LEVETIRACETAM 500 MG/1
750 TABLET ORAL 2 TIMES DAILY
Status: DISCONTINUED | OUTPATIENT
Start: 2022-02-07 | End: 2022-02-07 | Stop reason: HOSPADM

## 2022-02-07 RX ORDER — LEVETIRACETAM 750 MG/1
750 TABLET ORAL 2 TIMES DAILY
Qty: 60 TABLET | Refills: 0 | Status: SHIPPED | OUTPATIENT
Start: 2022-02-07 | End: 2022-02-07

## 2022-02-07 RX ADMIN — LISINOPRIL 20 MG: 20 TABLET ORAL at 09:37

## 2022-02-07 RX ADMIN — DILTIAZEM HYDROCHLORIDE 30 MG: 30 TABLET, FILM COATED ORAL at 09:37

## 2022-02-07 RX ADMIN — PANTOPRAZOLE SODIUM 40 MG: 40 INJECTION, POWDER, FOR SOLUTION INTRAVENOUS at 05:17

## 2022-02-07 RX ADMIN — LEVETIRACETAM 750 MG: 100 INJECTION, SOLUTION INTRAVENOUS at 05:17

## 2022-02-07 NOTE — DISCHARGE INSTRUCTIONS
Discharge Instructions    Discharged to home by car with relative. Discharged via wheelchair, hospital escort: Yes.  Special equipment needed: Not Applicable and Crutches    Be sure to schedule a follow-up appointment with your primary care doctor or any specialists as instructed.     Discharge Plan:   Follow-up with Primary Care Doctor and Neurosurgery Team    OK to get scalp wet in shower only, no submerging.    Will need repeat head CT and f/u with Dr. Merino in 4-6 weeks at [facility] [phone]            I understand that a diet low in cholesterol, fat, and sodium is recommended for good health. Unless I have been given specific instructions below for another diet, I accept this instruction as my diet prescription.   Other diet: regular      Special Instructions: None    · Is patient discharged on Warfarin / Coumadin?   No     Depression / Suicide Risk    As you are discharged from this [facility] facility, it is important to learn how to keep safe from harming yourself.    Recognize the warning signs:  · Abrupt changes in personality, positive or negative- including increase in energy   · Giving away possessions  · Change in eating patterns- significant weight changes-  positive or negative  · Change in sleeping patterns- unable to sleep or sleeping all the time   · Unwillingness or inability to communicate  · Depression  · Unusual sadness, discouragement and loneliness  · Talk of wanting to die  · Neglect of personal appearance   · Rebelliousness- reckless behavior  · Withdrawal from people/activities they love  · Confusion- inability to concentrate     If you or a loved one observes any of these behaviors or has concerns about self-harm, here's what you can do:  · Talk about it- your feelings and reasons for harming yourself  · Remove any means that you might use to hurt yourself (examples: pills, rope, extension cords, firearm)  · Get professional help from the community (Mental Health, Substance Abuse,  psychological counseling)  · Do not be alone:Call your Safe Contact- someone whom you trust who will be there for you.  · Call your local CRISIS HOTLINE 205-4840 or 098-026-8773  · Call your local Children's Mobile Crisis Response Team [place] (613) 227-8471 or www.[employer]. com  · Call the toll free National Suicide Prevention Hotlines   · National Suicide Prevention Lifeline 528-324-EOGS (8453)  · National Hope Line Network 800-SUICIDE (785-5773)

## 2022-02-07 NOTE — PROGRESS NOTES
CT abdomen/pelvis discussed with patient and wife.  No bowel obstruction.  Diverticulosis noted without diverticulitis.    Patient stated that morphine had alleviated his abdominal pain that he had prior to my shift.  He expressed significant anxiety that the pain would continue to return.  This pain has been recurrent throughout his stay, and on 2/4 he had projectile vomiting that he associated with it. I was informed by nurse of black stool earlier for which occult had already been ordered.  Occult stool had not been collected.  Appears that his hemoglobin is slightly downtrending.  He is on 40 mg Protonix twice daily.  Still following for occult.     If abdominal pain continues , hemoglobin further downtrends, and occult positive or other melanic episodes, may need GI consult.  Also to consider consider stress ulcer/ gastritis in the setting of his intracerebral injury and critical care course.    Patient requested anxiety relief and medication for insomnia.  Ordered hydroxyzine and melatonin.      Update: patient's bp 100/50. This is in spite of holding the nicardipine drip since 10 pm. Supports above comments.   6 a.m. BP meds held, resumption per day team

## 2022-02-07 NOTE — DISCHARGE SUMMARY
Discharge Summary    CHIEF COMPLAINT ON ADMISSION  No chief complaint on file.      Reason for Admission  SUBDURAL HEMATOMA     Admission Date  2/3/2022    CODE STATUS  Full Code    HPI & HOSPITAL COURSE  This is a 61 y.o. 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  He was previously treated with Coumadin.  The patient was thrown by a horse in November 2021.  Several days later he developed a headache and outpatient CT scan was ordered demonstrating a right subdural hematoma.  The patient was sent to the emergency room for evaluation, he was admitted and neurosurgery was consulted.  Patient was discharged home on 12/31/2021 with close neurosurgical follow-up.  He did follow-up with Dr. Merino's office and a repeat CT scan on 1/14/2022 showed worsening, in addition the patient complained of some left hand numbness and headaches.  He elected to have intervention and on 2/3/2023 he underwent right-sided craniotomy for evacuation of subdural hematoma with subdural drain placement.  Patient was admitted to the ICU post procedure.  Patient initially required nicardipine drip to control his blood pressure.  In addition he has developed postoperative ileus and acute renal failure.    Patient has been displayed labile blood pressure, follow-up CT scan showed over the head shows expected findings. At this time I recommend the patient 40 mg of lisinopril daily and follow-up closely with his primary care physician for management of his hypertension. He is to continue seizure prophylaxis with Keppra for total of 7 days.    Patient's ileus has resolved, on 2/6/2022 a CT scan of the abdomen pelvis was ordered sore some concerns about abdominal distention and pain. This showed no evidence of significant abnormality or obstruction. Patient is now tolerating a regular diet he can be discharged home.    Lastly the patient did have some  coffee-ground emesis during his hospitalization. Stool guaiac confirmed that he has some source of GI bleeding. The patient has been instructed to follow-up with his primary care provider to determine if further work-up including possibly colonoscopy is recommended. The patient does take Cologuard test each year.    Therefore, he is discharged in fair and stable condition to home with close outpatient follow-up.    The patient met 2-midnight criteria for an inpatient stay at the time of discharge.    Discharge Date  2/7/2022    FOLLOW UP ITEMS POST DISCHARGE  Follow-up with primary care  Follow-up with gastroenterology    DISCHARGE DIAGNOSES  Principal Problem (Resolved):    Subdural hematoma (HCC) POA: Yes  Active Problems:    H/O mitral valve repair POA: Yes  Resolved Problems:    Postoperative ileus (HCC) POA: No    Hypertensive urgency POA: Yes    Acute renal failure (HCC) POA: No    Coffee ground emesis POA: No      FOLLOW UP  No future appointments.  Alfredito Vela M.D.  [id number]  [facility] [address]  [phone]    In 2 weeks  Discuss blood pressure managment, discuss if you should get a colonoscopy based on blood noted in your stool during your hospital stay    Dilip Merino M.D.  [address]  [address]  [facility] [id number] [phone]    In 1 month        MEDICATIONS ON DISCHARGE     Medication List      CHANGE how you take these medications      Instructions   Acetaminophen Extra Strength 500 MG Tabs  What changed:   · how much to take  · when to take this  · reasons to take this  Generic drug: acetaminophen   Take 2 Tablets by mouth every 6 hours.  Dose: 1,000 mg     lisinopril 40 MG tablet  Start taking on: February 8, 2022  What changed:   · medication strength  · how much to take  Commonly known as: PRINIVIL   Take 1 Tablet by mouth every day.  Dose: 40 mg        CONTINUE taking these medications      Instructions   DILTIAZem 30 MG Tabs  Commonly known as: CARDIZEM   Take 30 mg by mouth  2 times a day.  Dose: 30 mg     levETIRAcetam 500 MG Tabs  Commonly known as: KEPPRA   Take 1 Tablet by mouth 2 times a day.  Dose: 500 mg     PROBIOTIC PO   Take 1 Tablet by mouth every day.  Dose: 1 Tablet     simvastatin 5 MG Tabs  Commonly known as: ZOCOR   Take 20 mg by mouth every evening.  Dose: 20 mg            Allergies  No Known Allergies    DIET  Orders Placed This Encounter   Procedures   • Diet Order Diet: Regular     Standing Status:   Standing     Number of Occurrences:   1     Order Specific Question:   Diet:     Answer:   Regular [1]       ACTIVITY  As tolerated. Shower and bathing instructions as per neurosurgery  Weight bearing as tolerated    CONSULTATIONS  Critical care  Neurosurgery    PROCEDURES  Surgery, 2/3/2022:  CREATION, RIGHT CRANIOTOMY FOR EVACUATION OF SUBDURAL HEMATOMA,PLACEMENT OF SUBDURAL DRAIN - Wound Class: Clean    LABORATORY  Lab Results   Component Value Date    SODIUM 140 02/07/2022    POTASSIUM 3.7 02/07/2022    CHLORIDE 104 02/07/2022    CO2 22 02/07/2022    GLUCOSE 113 (H) 02/07/2022    BUN 20 02/07/2022    CREATININE 0.90 02/07/2022        Lab Results   Component Value Date    WBC 5.8 02/07/2022    HEMOGLOBIN 12.5 (L) 02/07/2022    HEMATOCRIT 37.7 (L) 02/07/2022    PLATELETCT 148 (L) 02/07/2022        Total time of the discharge process exceeds 45 minutes.

## 2022-02-07 NOTE — CARE PLAN
The patient is Watcher - Medium risk of patient condition declining or worsening    Shift Goals  Clinical Goals: BP < 160/ stable neuro exam  Patient Goals: Rest/sleep  Family Goals: Rest    Progress made toward(s) clinical / shift goals:  Cardene drip stopped .   SBP remains < 165.Neuro stable.     Problem: Pain - Standard  Goal: Alleviation of pain or a reduction in pain to the patient’s comfort goal  Outcome: Progressing     Problem: Knowledge Deficit - Standard  Goal: Patient and family/care givers will demonstrate understanding of plan of care, disease process/condition, diagnostic tests and medications  Outcome: Progressing     Problem: Neuro Status  Goal: Neuro status will remain stable or improve  Outcome: Progressing     Problem: Gastrointestinal Irritability  Goal: Nausea and vomiting will be absent or improve  Outcome: Progressing     Patient is not progressing towards the following goals:      22:00 Nicardipine drip stopped.   04 :00 HR sustaining at low 50's . SB . SBP's 90's - 100's . Dr. Chang aware.

## 2022-02-07 NOTE — CARE PLAN
The patient is Stable - Low risk of patient condition declining or worsening    Shift Goals  Clinical Goals: BP and Anxiety Control  Patient Goals: Walk around the unit  Family Goals: MARCELINO    Progress made toward(s) clinical / shift goals:  Cardene gtt off since 2/6 2200, SBP < 165, Pt up to chair this AM, will assess appropriateness of further mobility based on vitals.     Patient is not progressing towards the following goals:

## 2022-02-07 NOTE — PROGRESS NOTES
At 1300, pt notified this RN that he was feeling flushed and his right arm was tingling. Dr. Ramirez notified, MD stated he will be at the bedside ASAP. Patient assisted from chair to bed by RN, CCT and Nursing Student. Vitals rechecked and patient placed on tele, nibp and continuous pulse ox. Scheduled Lisinopril PO and Hydralazine IVP recently given. Labetalol IVP given while RN was waiting for MD to come to bedside to assess pt. Dr. Ramirez arrived at 1305 and performed bedside assessment. MD ok with additional dose of Labetalol IVP to be given, MD to place orders for Cardene gtt, STAT Head CT, Abd CT and bladder scan.

## 2022-02-07 NOTE — PROGRESS NOTES
Dr. Ramirez on the unit to discuss discharging home with patient and his spouse. Per MD, ok for patient to discharge home. Discharge lounge contacted this RN and stated they will  patient and go over full discharge instructions and remove peripheral IVs.

## 2022-02-07 NOTE — PROGRESS NOTES
Neurosurgery Progress Note    Subjective:  POD4  R Craniotomy for SDH   Headaches improved.  C/o mild L hand numbness and weakness that is subjectively worse compared to preop   Abdominal pain improving, CT showed no ilues    Exam:  AOx3  PERRL, EOMI  MAEx4, no drift  C/d/i  Mild numbness of fingertips L hand and decreased dexterity in limb/1st 2 digits, present prior to surgery    BP  Min: 99/47  Max: 206/86  Pulse  Av.2  Min: 52  Max: 93  Resp  Av  Min: 13  Max: 60  Temp  Av.9 °C (98.4 °F)  Min: 36.6 °C (97.8 °F)  Max: 37.3 °C (99.1 °F)  SpO2  Av.7 %  Min: 89 %  Max: 98 %    No data recorded    Recent Labs     22  0158 22  1328 22  0253   WBC 11.3* 5.7 5.8   RBC 4.54* 4.49* 4.22*   HEMOGLOBIN 13.6*  13.6* 13.3* 12.5*   HEMATOCRIT 40.9*  40.9* 40.4* 37.7*   MCV 90.3 90.0 89.3   MCH 30.0 29.6 29.6   MCHC 33.2* 32.9* 33.2*   RDW 42.1 41.2 40.8   PLATELETCT 178 126* 148*   MPV 11.8 11.4 10.9     Recent Labs     22  0402 22  0400 22  0253   SODIUM 137 140 140   POTASSIUM 4.4 4.5 3.7   CHLORIDE 100 107 104   CO2 24 23 22   GLUCOSE 123* 95 113*   BUN 59* 34* 20   CREATININE 1.85* 0.95 0.90   CALCIUM 9.2 8.9 8.6               Intake/Output                       22 - 22 - 22 Total  Total                 Intake    P.O.  60  400 460  60  -- 60    P.O. 60 400 460 60 -- 60    I.V.  643.3  395.8 1039.2  --  -- --    Cardene Volume 493.3 395.8 889.2 -- -- --    Volume (mL) (NS infusion) 150 -- 150 -- -- --    Total Intake 703.3 795.8 1499.2 60 -- 60       Output    Urine  1200  730 1930  100  -- 100    Number of Times Voided 8 x -- 8 x 1 x -- 1 x    Urine Void (mL) 1753 190 5632 100 -- 100    Stool  --  -- --  --  -- --    Number of Times Stooled 1 x -- 1 x 2 x -- 2 x    Total Output 5692 000 3736 100 -- 100       Net I/O     -496.7 65.8 -430.8 -40 -- -40            Intake/Output  Summary (Last 24 hours) at 2/7/2022 1016  Last data filed at 2/7/2022 0800  Gross per 24 hour   Intake 1349.16 ml   Output 1880 ml   Net -530.84 ml       $ Bladder Scan Results (mL): 250    • levETIRAcetam (Keppra) IV  750 mg Q12HRS   • artificial tears  1 Application Q8HRS PRN   • pantoprazole  40 mg BID   • prochlorperazine  10 mg Q6HRS PRN   • lidocaine  15 mL Q4HRS PRN   • DILTIAZem  30 mg BID   • lisinopril  20 mg DAILY   • simvastatin  20 mg Nightly   • Pharmacy Consult Request  1 Each PHARMACY TO DOSE   • MD ALERT...DO NOT ADMINISTER NSAIDS or ASPIRIN unless ORDERED By Neurosurgery  1 Each PRN   • ondansetron  4 mg Q4HRS PRN   • cloNIDine  0.1 mg Q4HRS PRN   • docusate sodium  100 mg BID   • senna-docusate  1 Tablet Nightly   • senna-docusate  1 Tablet Q24HRS PRN   • polyethylene glycol/lytes  1 Packet BID PRN   • magnesium hydroxide  30 mL QDAY PRN   • bisacodyl  10 mg Q24HRS PRN   • sodium phosphate  1 Each Once PRN   • oxyCODONE-acetaminophen  1 Tablet Q4HRS PRN   • benzocaine-menthol  1 Lozenge Q2HRS PRN   • diphenhydrAMINE  25 mg Q6HRS PRN    Or   • diphenhydrAMINE  25 mg Q6HRS PRN   • hydrALAZINE  20 mg Q HOUR PRN   • labetalol  10-20 mg Q HOUR PRN       Assessment and Plan:  Hospital day #5  POD#4 for R craniotomy and subdural drain for chronic SDH         Ambulating well  keppra 500 BID for 7 days  OK for q4h neuro checks  Appreciate continued medical management  CT head reviewed and stable 2/6/22  OK to DC patient to home from surgery standpoint, pending medical clearance.  OK to get scalp we in shower only, no submerging.  Will need repeat head CT and f/u with Dr. Merino in 4-6 weeks at [facility] [phone]      Prophylactic anticoagulation: yes

## 2025-02-03 ENCOUNTER — TELEPHONE (OUTPATIENT)
Dept: HEALTH INFORMATION MANAGEMENT | Facility: OTHER | Age: 65
End: 2025-02-03
Payer: COMMERCIAL

## (undated) DEVICE — PROTECTOR ULNA NERVE - (36PR/CA)

## (undated) DEVICE — SET LEADWIRE 5 LEAD BEDSIDE DISPOSABLE ECG (1SET OF 5/EA)

## (undated) DEVICE — LACTATED RINGERS INJ 1000 ML - (14EA/CA 60CA/PF)

## (undated) DEVICE — SURGIFOAM (SIZE 100) - (6EA/CA)

## (undated) DEVICE — SUTURE 4-0 MONOCRYL PLUS PS-2 - 27 INCH (36/BX)

## (undated) DEVICE — PACK CRANI - (1EA/CA)

## (undated) DEVICE — CORDS BIPOLAR COAGULATION - 12FT STERILE DISP. (10EA/BX)

## (undated) DEVICE — DRAPE STRLE REG TOWEL 18X24 - (10/BX 4BX/CA)"

## (undated) DEVICE — BOVIE BLADE COATED &INSULATED - 25/PK

## (undated) DEVICE — HEMOSTAT SURG ABSORBABLE - 4 X 8 IN SURGICEL (24EA/CA)

## (undated) DEVICE — DISSECT TOOL MIDAS REX - (M-2)

## (undated) DEVICE — TOWELS CLOTH SURGICAL - (4/PK 20PK/CA)

## (undated) DEVICE — DISSECT TOOL MIDAS F2/8TA23

## (undated) DEVICE — SENSOR SPO2 NEO LNCS ADHESIVE (20/BX) SEE USER NOTES

## (undated) DEVICE — BLADE CLIPPER FITS 2501 CLIPPER (BLUE)  (20EA/CA)

## (undated) DEVICE — CANISTER SUCTION 3000ML MECHANICAL FILTER AUTO SHUTOFF MEDI-VAC NONSTERILE LF DISP  (40EA/CA)

## (undated) DEVICE — DISSECT TOOL MIDAS F1/8TA15

## (undated) DEVICE — TUBING CLEARLINK DUO-VENT - C-FLO (48EA/CA)

## (undated) DEVICE — DRAPE IOBAN II INCISE 23X17 - (10EA/BX 4BX/CA)

## (undated) DEVICE — SCALP CLIP RANEY 20-1037 (10EA/PK 20PK/CA)

## (undated) DEVICE — SUCTION INSTRUMENT YANKAUER BULBOUS TIP W/O VENT (50EA/CA)

## (undated) DEVICE — TUBE E-T HI-LO CUFF 7.5MM (10EA/PK)

## (undated) DEVICE — GLOVE SZ 8 BIOGEL PI MICRO - PF LF (50PR/BX)

## (undated) DEVICE — FORCEPS ELECTROSURGICAL DISPOSABLE CODMAN 8IN 1.5MM

## (undated) DEVICE — SUTURE 2-0 VICRYL PLUS CT-1 - 8 X 18 INCH(12/BX)

## (undated) DEVICE — NUROLON 3-0 RB-1 - (12/BX)

## (undated) DEVICE — ELECTRODE DUAL RETURN W/ CORD - (50/PK)

## (undated) DEVICE — GLOVE SZ 6.5 BIOGEL PI MICRO - PF LF (50PR/BX)

## (undated) DEVICE — STAPLER SKIN DISP - (6/BX 10BX/CA) VISISTAT

## (undated) DEVICE — BLADE SURGICAL #15 - (50/BX 3BX/CA)

## (undated) DEVICE — DRESSING NON-ADHERING 8 X 3 - (50/BX)

## (undated) DEVICE — SODIUM CHL IRRIGATION 0.9% 1000ML (12EA/CA)

## (undated) DEVICE — ELECTRODE 850 FOAM ADHESIVE - HYDROGEL RADIOTRNSPRNT (50/PK)

## (undated) DEVICE — STAY ELASTIC BLUNT RETRACTOR 12MM (8EA/PK)

## (undated) DEVICE — TOWEL STOP TIMEOUT SAFETY FLAG (40EA/CA)

## (undated) DEVICE — GOWN WARMING STANDARD FLEX - (30/CA)

## (undated) DEVICE — NEPTUNE 4 PORT MANIFOLD - (20/PK)

## (undated) DEVICE — CATHETER IV SAFETY 14 GA X 2 IN (200/CA)

## (undated) DEVICE — PEN SKIN MARKER W/RULER - (50EA/BX)

## (undated) DEVICE — KIT SURGIFLO W/OUT THROMBIN - (6EA/CA)

## (undated) DEVICE — BALL COTTON NEURO 1 INCH - (5/PK50PK/CA)

## (undated) DEVICE — KIT ANESTHESIA W/CIRCUIT & 3/LT BAG W/FILTER (20EA/CA)

## (undated) DEVICE — TRAY SURESTEP FOLEY TEMP SENSING 16FR (10EA/CA) ORDER  #18764 FOR TEMP FOLEY ONLY

## (undated) DEVICE — DRAIN CSF WITH ANTI REFLUX VALVE

## (undated) DEVICE — SET EXTENSION WITH 2 PORTS (48EA/CA) ***PART #2C8610 IS A SUBSTITUTE*****

## (undated) DEVICE — GLOVE BIOGEL PI INDICATOR SZ 7.0 SURGICAL PF LF - (50/BX 4BX/CA)

## (undated) DEVICE — FORCEPS IRRIGATING 8 X 1.5MM (5EA/BX)

## (undated) DEVICE — LACTATED RINGERS INJ. 500 ML - (24EA/CA)

## (undated) DEVICE — PATTIES SURG X-RAYCOTTONOID - 1 X 3 IN (200/CA)

## (undated) DEVICE — GLOVE SIZE 7.0 SURGEON ACCELERATOR FREE GREEN (50PR/BX 4BX/CA)

## (undated) DEVICE — CATHETER FOLEY ROBINSON 8FR 16IN STRL (12/CA)

## (undated) DEVICE — MASK ANESTHESIA ADULT  - (100/CA)

## (undated) DEVICE — SURGIFOAM (12X7) - (12EA/CA)

## (undated) DEVICE — SUTURE GENERAL